# Patient Record
Sex: FEMALE | Race: WHITE | NOT HISPANIC OR LATINO | Employment: OTHER | URBAN - METROPOLITAN AREA
[De-identification: names, ages, dates, MRNs, and addresses within clinical notes are randomized per-mention and may not be internally consistent; named-entity substitution may affect disease eponyms.]

---

## 2019-04-17 ENCOUNTER — HOSPITAL ENCOUNTER (OUTPATIENT)
Dept: LAB | Facility: HOSPITAL | Age: 54
Discharge: HOME | End: 2019-04-17
Attending: FAMILY MEDICINE | Admitting: FAMILY MEDICINE

## 2019-04-17 LAB
ALBUMIN SERPL-MCNC: 3.8 G/DL (ref 3.5–4.8)
ALBUMIN/GLOB SERPL: 1.2 {RATIO} (ref 1–1.7)
ALP SERPL-CCNC: 85 IU/L (ref 32–91)
ALT SERPL-CCNC: 23 IU/L (ref 14–54)
ANION GAP SERPL CALC-SCNC: 14.6 MMOL/L (ref 10–20)
AST SERPL-CCNC: 24 IU/L (ref 15–41)
BASOPHILS # BLD AUTO: 0 10*3/UL (ref 0–0.2)
BASOPHILS NFR BLD AUTO: 0 % (ref 0–2)
BILIRUB SERPL-MCNC: 0.3 MG/DL (ref 0.3–1.2)
BUN SERPL-MCNC: 17 MG/DL (ref 8–20)
BUN/CREAT SERPL: 24.3 (ref 5.4–26.2)
CALCIUM SERPL-MCNC: 9.1 MG/DL (ref 8.9–10.3)
CHLORIDE SERPL-SCNC: 103 MMOL/L (ref 101–111)
CHOLEST SERPL-MCNC: 155 MG/DL
CHOLEST/HDLC SERPL: 3.1 {RATIO}
CONV CO2: 24 MMOL/L (ref 22–32)
CONV LDL CHOLESTEROL DIRECT: 86 MG/DL (ref 0–100)
CONV TOTAL PROTEIN: 7 G/DL (ref 6.1–7.9)
CREAT UR-MCNC: 0.7 MG/DL (ref 0.4–1)
DIFFERENTIAL METHOD BLD: (no result)
EOSINOPHIL # BLD AUTO: 0.1 10*3/UL (ref 0–0.3)
EOSINOPHIL # BLD AUTO: 2 % (ref 0–3)
ERYTHROCYTE [DISTWIDTH] IN BLOOD BY AUTOMATED COUNT: 13.5 % (ref 11.5–14.5)
GLOBULIN UR ELPH-MCNC: 3.2 G/DL (ref 2.5–3.8)
GLUCOSE SERPL-MCNC: 98 MG/DL (ref 65–99)
HCT VFR BLD AUTO: 37.6 % (ref 35–49)
HDLC SERPL-MCNC: 51 MG/DL
HGB BLD-MCNC: 12.6 G/DL (ref 12–15)
LDLC/HDLC SERPL: 1.7 {RATIO}
LIPID INTERPRETATION: NORMAL
LYMPHOCYTES # BLD AUTO: 1.4 10*3/UL (ref 0.8–4.8)
LYMPHOCYTES NFR BLD AUTO: 28 % (ref 18–42)
MCH RBC QN AUTO: 30.9 PG (ref 26–32)
MCHC RBC AUTO-ENTMCNC: 33.4 G/DL (ref 32–36)
MCV RBC AUTO: 92.3 FL (ref 80–94)
MONOCYTES # BLD AUTO: 0.4 10*3/UL (ref 0.1–1.3)
MONOCYTES NFR BLD AUTO: 7 % (ref 2–11)
NEUTROPHILS # BLD AUTO: 3.2 10*3/UL (ref 2.3–8.6)
NEUTROPHILS NFR BLD AUTO: 63 % (ref 50–75)
NRBC BLD AUTO-RTO: 0 /100{WBCS}
NRBC/RBC NFR BLD MANUAL: 0 10*3/UL
PLATELET # BLD AUTO: 157 10*3/UL (ref 150–450)
PMV BLD AUTO: 10.1 FL (ref 7.4–10.4)
POTASSIUM SERPL-SCNC: 3.6 MMOL/L (ref 3.6–5.1)
RBC # BLD AUTO: 4.07 10*6/UL (ref 4–5.4)
SODIUM SERPL-SCNC: 138 MMOL/L (ref 136–144)
TRIGL SERPL-MCNC: 54 MG/DL
VLDLC SERPL CALC-MCNC: 17.9 MG/DL
WBC # BLD AUTO: 5.1 10*3/UL (ref 4.5–11.5)

## 2019-08-20 ENCOUNTER — LAB (OUTPATIENT)
Dept: LAB | Facility: HOSPITAL | Age: 54
End: 2019-08-20

## 2019-08-20 ENCOUNTER — TRANSCRIBE ORDERS (OUTPATIENT)
Dept: ADMINISTRATIVE | Facility: HOSPITAL | Age: 54
End: 2019-08-20

## 2019-08-20 DIAGNOSIS — R32 URINARY INCONTINENCE, UNSPECIFIED TYPE: ICD-10-CM

## 2019-08-20 DIAGNOSIS — R32 URINARY INCONTINENCE, UNSPECIFIED TYPE: Primary | ICD-10-CM

## 2019-08-20 LAB
BACTERIA UR QL AUTO: ABNORMAL /HPF
BILIRUB UR QL STRIP: NEGATIVE
CLARITY UR: ABNORMAL
COLOR UR: YELLOW
GLUCOSE UR STRIP-MCNC: NEGATIVE MG/DL
HGB UR QL STRIP.AUTO: ABNORMAL
HYALINE CASTS UR QL AUTO: ABNORMAL /LPF
KETONES UR QL STRIP: NEGATIVE
LEUKOCYTE ESTERASE UR QL STRIP.AUTO: ABNORMAL
NITRITE UR QL STRIP: NEGATIVE
PH UR STRIP.AUTO: 6 [PH] (ref 5–8)
PROT UR QL STRIP: NEGATIVE
RBC # UR: ABNORMAL /HPF
REF LAB TEST METHOD: ABNORMAL
SP GR UR STRIP: <=1.005 (ref 1–1.03)
SQUAMOUS #/AREA URNS HPF: ABNORMAL /HPF
UROBILINOGEN UR QL STRIP: ABNORMAL
WBC UR QL AUTO: ABNORMAL /HPF

## 2019-08-20 PROCEDURE — 81001 URINALYSIS AUTO W/SCOPE: CPT

## 2019-09-10 ENCOUNTER — TRANSCRIBE ORDERS (OUTPATIENT)
Dept: ADMINISTRATIVE | Facility: HOSPITAL | Age: 54
End: 2019-09-10

## 2019-09-10 DIAGNOSIS — R33.9 URINARY RETENTION: Primary | ICD-10-CM

## 2019-09-12 ENCOUNTER — HOSPITAL ENCOUNTER (OUTPATIENT)
Dept: ULTRASOUND IMAGING | Facility: HOSPITAL | Age: 54
Discharge: HOME | End: 2019-09-12
Admitting: UROLOGY

## 2019-09-12 DIAGNOSIS — R33.9 URINARY RETENTION: ICD-10-CM

## 2019-09-12 PROCEDURE — 76775 US EXAM ABDO BACK WALL LIM: CPT

## 2019-11-26 ENCOUNTER — OFFICE (OUTPATIENT)
Dept: URBAN - METROPOLITAN AREA CLINIC 64 | Facility: CLINIC | Age: 54
End: 2019-11-26
Payer: MEDICARE

## 2019-11-26 VITALS
DIASTOLIC BLOOD PRESSURE: 73 MMHG | SYSTOLIC BLOOD PRESSURE: 109 MMHG | WEIGHT: 158 LBS | HEIGHT: 67 IN | HEART RATE: 84 BPM

## 2019-11-26 DIAGNOSIS — Z12.11 ENCOUNTER FOR SCREENING FOR MALIGNANT NEOPLASM OF COLON: ICD-10-CM

## 2019-11-26 DIAGNOSIS — R05 COUGH: ICD-10-CM

## 2019-11-26 DIAGNOSIS — K21.9 GASTRO-ESOPHAGEAL REFLUX DISEASE WITHOUT ESOPHAGITIS: ICD-10-CM

## 2019-11-26 PROCEDURE — 99243 OFF/OP CNSLTJ NEW/EST LOW 30: CPT | Performed by: NURSE PRACTITIONER

## 2019-11-26 PROCEDURE — 99203 OFFICE O/P NEW LOW 30 MIN: CPT | Performed by: NURSE PRACTITIONER

## 2019-11-29 RX ORDER — LEVOTHYROXINE SODIUM 0.1 MG/1
100 TABLET ORAL DAILY
COMMUNITY
End: 2022-08-02

## 2019-11-29 RX ORDER — VENLAFAXINE HYDROCHLORIDE 75 MG/1
75 CAPSULE, EXTENDED RELEASE ORAL DAILY
COMMUNITY
End: 2022-01-05

## 2019-11-29 RX ORDER — LORAZEPAM 0.5 MG/1
0.5 TABLET ORAL 2 TIMES DAILY
COMMUNITY
End: 2021-04-20 | Stop reason: SDUPTHER

## 2019-11-29 RX ORDER — LORATADINE 10 MG/1
1 CAPSULE, LIQUID FILLED ORAL DAILY
COMMUNITY
End: 2022-08-02 | Stop reason: SDUPTHER

## 2019-11-29 RX ORDER — QUETIAPINE FUMARATE 50 MG/1
50 TABLET, FILM COATED ORAL 2 TIMES DAILY
COMMUNITY
End: 2021-04-20 | Stop reason: SDUPTHER

## 2019-11-29 RX ORDER — OMEPRAZOLE 40 MG/1
40 CAPSULE, DELAYED RELEASE ORAL DAILY
COMMUNITY
End: 2022-08-02 | Stop reason: SDUPTHER

## 2019-12-02 ENCOUNTER — ANESTHESIA EVENT (OUTPATIENT)
Dept: GASTROENTEROLOGY | Facility: HOSPITAL | Age: 54
End: 2019-12-02

## 2019-12-02 RX ORDER — MAGNESIUM CARB/ALUMINUM HYDROX 105-160MG
296 TABLET,CHEWABLE ORAL ONCE
Status: DISCONTINUED | OUTPATIENT
Start: 2019-12-02 | End: 2019-12-03 | Stop reason: HOSPADM

## 2019-12-03 ENCOUNTER — ANESTHESIA (OUTPATIENT)
Dept: GASTROENTEROLOGY | Facility: HOSPITAL | Age: 54
End: 2019-12-03

## 2019-12-03 ENCOUNTER — HOSPITAL ENCOUNTER (OUTPATIENT)
Facility: HOSPITAL | Age: 54
Setting detail: HOSPITAL OUTPATIENT SURGERY
Discharge: HOME | End: 2019-12-03
Attending: INTERNAL MEDICINE | Admitting: INTERNAL MEDICINE

## 2019-12-03 ENCOUNTER — ON CAMPUS - OUTPATIENT (OUTPATIENT)
Dept: URBAN - METROPOLITAN AREA HOSPITAL 85 | Facility: HOSPITAL | Age: 54
End: 2019-12-03

## 2019-12-03 VITALS
RESPIRATION RATE: 14 BRPM | SYSTOLIC BLOOD PRESSURE: 116 MMHG | WEIGHT: 158 LBS | BODY MASS INDEX: 24.8 KG/M2 | OXYGEN SATURATION: 99 % | TEMPERATURE: 97.9 F | HEART RATE: 82 BPM | HEIGHT: 67 IN | DIASTOLIC BLOOD PRESSURE: 72 MMHG

## 2019-12-03 DIAGNOSIS — Z12.11 ENCOUNTER FOR SCREENING FOR MALIGNANT NEOPLASM OF COLON: ICD-10-CM

## 2019-12-03 DIAGNOSIS — Z12.11 SCREENING FOR MALIGNANT NEOPLASM OF COLON: ICD-10-CM

## 2019-12-03 DIAGNOSIS — R05.3 CHRONIC COUGH: ICD-10-CM

## 2019-12-03 DIAGNOSIS — K44.9 DIAPHRAGMATIC HERNIA WITHOUT OBSTRUCTION OR GANGRENE: ICD-10-CM

## 2019-12-03 DIAGNOSIS — K21.9 GASTRO-ESOPHAGEAL REFLUX DISEASE WITHOUT ESOPHAGITIS: ICD-10-CM

## 2019-12-03 DIAGNOSIS — K63.5 POLYP OF COLON: ICD-10-CM

## 2019-12-03 DIAGNOSIS — R05 COUGH: ICD-10-CM

## 2019-12-03 DIAGNOSIS — K22.2 ESOPHAGEAL OBSTRUCTION: ICD-10-CM

## 2019-12-03 DIAGNOSIS — D12.8 BENIGN NEOPLASM OF RECTUM: ICD-10-CM

## 2019-12-03 DIAGNOSIS — K21.9 GERD (GASTROESOPHAGEAL REFLUX DISEASE): ICD-10-CM

## 2019-12-03 PROCEDURE — 43235 EGD DIAGNOSTIC BRUSH WASH: CPT | Mod: 59 | Performed by: INTERNAL MEDICINE

## 2019-12-03 PROCEDURE — 45388 COLONOSCOPY W/ABLATION: CPT | Mod: PT,59 | Performed by: INTERNAL MEDICINE

## 2019-12-03 PROCEDURE — 45388 COLONOSCOPY W/ABLATION: CPT | Mod: 59,PT | Performed by: INTERNAL MEDICINE

## 2019-12-03 PROCEDURE — 25010000002 PROPOFOL 200 MG/20ML EMULSION: Performed by: ANESTHESIOLOGY

## 2019-12-03 PROCEDURE — 45390 COLONOSCOPY W/RESECTION: CPT | Mod: 59,PT | Performed by: INTERNAL MEDICINE

## 2019-12-03 PROCEDURE — 43450 DILATE ESOPHAGUS 1/MULT PASS: CPT | Performed by: INTERNAL MEDICINE

## 2019-12-03 PROCEDURE — 88305 TISSUE EXAM BY PATHOLOGIST: CPT | Performed by: INTERNAL MEDICINE

## 2019-12-03 PROCEDURE — 45390 COLONOSCOPY W/RESECTION: CPT | Mod: PT,59 | Performed by: INTERNAL MEDICINE

## 2019-12-03 PROCEDURE — 45385 COLONOSCOPY W/LESION REMOVAL: CPT | Mod: 59,PT | Performed by: INTERNAL MEDICINE

## 2019-12-03 RX ORDER — SODIUM CHLORIDE 0.9 % (FLUSH) 0.9 %
3 SYRINGE (ML) INJECTION EVERY 12 HOURS SCHEDULED
Status: DISCONTINUED | OUTPATIENT
Start: 2019-12-03 | End: 2019-12-03 | Stop reason: HOSPADM

## 2019-12-03 RX ORDER — SODIUM CHLORIDE 0.9 % (FLUSH) 0.9 %
3-10 SYRINGE (ML) INJECTION AS NEEDED
Status: DISCONTINUED | OUTPATIENT
Start: 2019-12-03 | End: 2019-12-03 | Stop reason: HOSPADM

## 2019-12-03 RX ORDER — SODIUM CHLORIDE 9 MG/ML
9 INJECTION, SOLUTION INTRAVENOUS CONTINUOUS PRN
Status: DISCONTINUED | OUTPATIENT
Start: 2019-12-03 | End: 2019-12-03 | Stop reason: HOSPADM

## 2019-12-03 RX ORDER — PROPOFOL 10 MG/ML
INJECTION, EMULSION INTRAVENOUS AS NEEDED
Status: DISCONTINUED | OUTPATIENT
Start: 2019-12-03 | End: 2019-12-03 | Stop reason: SURG

## 2019-12-03 RX ORDER — SODIUM CHLORIDE 0.9 % (FLUSH) 0.9 %
10 SYRINGE (ML) INJECTION AS NEEDED
Status: DISCONTINUED | OUTPATIENT
Start: 2019-12-03 | End: 2019-12-03 | Stop reason: HOSPADM

## 2019-12-03 RX ORDER — LIDOCAINE HYDROCHLORIDE 20 MG/ML
INJECTION, SOLUTION EPIDURAL; INFILTRATION; INTRACAUDAL; PERINEURAL AS NEEDED
Status: DISCONTINUED | OUTPATIENT
Start: 2019-12-03 | End: 2019-12-03 | Stop reason: SURG

## 2019-12-03 RX ORDER — ONDANSETRON 2 MG/ML
4 INJECTION INTRAMUSCULAR; INTRAVENOUS ONCE AS NEEDED
Status: DISCONTINUED | OUTPATIENT
Start: 2019-12-03 | End: 2019-12-03 | Stop reason: HOSPADM

## 2019-12-03 RX ADMIN — PROPOFOL 25 MG: 10 INJECTION, EMULSION INTRAVENOUS at 11:30

## 2019-12-03 RX ADMIN — PROPOFOL 100 MG: 10 INJECTION, EMULSION INTRAVENOUS at 11:05

## 2019-12-03 RX ADMIN — SODIUM CHLORIDE 9 ML/HR: 900 INJECTION, SOLUTION INTRAVENOUS at 10:11

## 2019-12-03 RX ADMIN — PROPOFOL 50 MG: 10 INJECTION, EMULSION INTRAVENOUS at 10:45

## 2019-12-03 RX ADMIN — LIDOCAINE HYDROCHLORIDE 50 MG: 20 INJECTION, SOLUTION EPIDURAL; INFILTRATION; INTRACAUDAL; PERINEURAL at 11:05

## 2019-12-03 RX ADMIN — PROPOFOL 25 MG: 10 INJECTION, EMULSION INTRAVENOUS at 11:22

## 2019-12-03 RX ADMIN — PROPOFOL 25 MG: 10 INJECTION, EMULSION INTRAVENOUS at 11:39

## 2019-12-03 RX ADMIN — PROPOFOL 50 MG: 10 INJECTION, EMULSION INTRAVENOUS at 11:10

## 2019-12-03 RX ADMIN — PROPOFOL 50 MG: 10 INJECTION, EMULSION INTRAVENOUS at 11:07

## 2019-12-03 RX ADMIN — PROPOFOL 50 MG: 10 INJECTION, EMULSION INTRAVENOUS at 11:15

## 2019-12-03 NOTE — OP NOTE
ESOPHAGOGASTRODUODENOSCOPY, COLONOSCOPY Procedure Report    Patient Name:  Erna Shine  YOB: 1965    Date of Surgery:  12/3/2019     Pre-Op Diagnosis:  GERD, CHRONIC COUGH, SCREENING FOR MALIGNANT NEOPLASMS OF COLON         Procedure/CPT® Codes:      Procedure(s):  Esophagogastroduodenoscopy with dilatation (bougie 50 cm) and biopsy x 1 area  colonoscopy with sclerotherapy of polypectomy site x1 area, polypectomy x4, fulgrated polyp x4     Staff:  Surgeon(s):  Raymond Gan MD      Anesthesia: Monitored Anesthesia Care    Description of Procedure:  A description of the procedure as well as risks, benefits and alternative methods were explained to the patient who voiced understanding and signed the corresponding consent form. A physical exam was performed and vital signs were monitored throughout the procedure.    An upper GI endoscope was placed into the mouth and proceeded through the esophagus, stomach and second portion of the duodenum without difficulty. The scope was then retroflexed and the fundus was visualized. The procedure was not difficult and there were no immediate complications.  C of 190 scope ventricularization rectum to cecum and terminal ileum.  Prep was marginal.    Upper endoscopy examination did show inflammatory changes as well as some mucosal irregularity in the lower third of the esophagus biopsy was taken.  There is also a stricture of the lower third of the esophagus which was dilated to 50 [place].  At least 7 cm hiatal hernia was noted with a hiatus narrowing around 42 cm and GE junction 35 cm.    Gastric dual mucosa looks normal.    In cecum, large around 1.5 cm sessile multilobulated polyp was lifted with the oral eyes and subsequently Endo mucosal resection was performed.  1 of the polyp of the cecum was also removed with a hot snare polypectomy technique which was around 8 to 7 mm.  Tumor polyp removed from the ascending colon area with a hot snare  polypectomy technique measuring at 6 and 7 mm.  3 polyp removed from the rectum they were fulgurated.  Another polyp in the sigmoid was fulgurated.  Extensive there is some diverticulosis sigmoid descending colon noted with a moderate-sized internal/external hemorrhoid.    Impression:  1.  Large hiatal hernia  2.  Esophageal stricture dilated  3.  Suspect erosive esophagitis due to acid reflux however due to significant nodularity of the lower third, biopsy was performed.  4.  Multiple polyps removed as detailed above.  5.  diverticulosis  6.  hemorrhoids    Recommendations:  Repeat colonoscopy in 3 years.  Patient will be placed on a PPI p.o. daily.  Follow the GI clinic as needed.  MiraLAX once a day.  Follow up with GI clinic as needed  Follow up with PCP as scheduled  Follow up with biopsy report      Raymond Gan MD     Date: 12/3/2019    Time: 11:42 AM

## 2019-12-03 NOTE — ANESTHESIA POSTPROCEDURE EVALUATION
Patient: Erna Shine    Procedure Summary     Date:  12/03/19 Room / Location:  [facility] ENDOSCOPY 1 / [facility] ENDOSCOPY    Anesthesia Start:  1101 Anesthesia Stop:      Procedures:       Esophagogastroduodenoscopy with dilatation (bougie 50 cm) and biopsy x 1 area (N/A )      colonoscopy with sclerotherapy of polypectomy site x1 area, polypectomy x4, fulgrated polyp x4  (N/A Rectum) Diagnosis:       GERD (gastroesophageal reflux disease)      Chronic cough      Screening for malignant neoplasm of colon      (GERD, CHRONIC COUGH, SCREENING FOR MALIGNANT NEOPLASMS OF COLON)    Surgeon:  Raymond Gan MD Provider:  Steven Johnson MD    Anesthesia Type:  MAC ASA Status:  2          Anesthesia Type: MAC  Last vitals  BP   107/65 (12/03/19 1004)   Temp   97.9 °F (36.6 °C) (12/03/19 1004)   Pulse   84 (12/03/19 1004)   Resp   20 (12/03/19 1004)     SpO2   99 % (12/03/19 1004)     Post Anesthesia Care and Evaluation    Patient location during evaluation: ICU  Patient participation: complete - patient cannot participate  Level of consciousness: obtunded/minimal responses  Pain scale: See nurse's notes for pain score.  Pain management: adequate  Airway patency: patent  Anesthetic complications: No anesthetic complications  PONV Status: none  Cardiovascular status: acceptable  Respiratory status: acceptable, oral airway and nasal cannula  Hydration status: acceptable    Comments: Patient seen and examined postoperatively; vital signs stable; SpO2 greater than or equal to 90%; cardiopulmonary status stable; nausea/vomiting adequately controlled; pain adequately controlled; no apparent anesthesia complications; patient discharged from anesthesia care when discharge criteria were met

## 2019-12-03 NOTE — DISCHARGE INSTRUCTIONS
A responsible adult should stay with you and you should rest quietly for the rest of the day.    Do not drink alcohol, drive, operate any heavy machinery or power tools or make any legal/important decisions for the next 24 hours.     Progress your diet as tolerated.  If you begin to experience severe pain, increased shortness of breath, racing heartbeat or a fever above 101 F, seek immediate medical attention.     Follow up with MD as instructed. Call office for results in 3 to 5 days [phone].  Take Miralax Daily.  Take PPI (reflux medication) daily.

## 2019-12-03 NOTE — ANESTHESIA PREPROCEDURE EVALUATION
Anesthesia Evaluation     Patient summary reviewed and Nursing notes reviewed   NPO Solid Status: > 6 hours  NPO Liquid Status: > 6 hours           Airway   Mallampati: I  TM distance: >3 FB  Neck ROM: full  No difficulty expected  Dental - normal exam   (+) poor dentition    Pulmonary - negative pulmonary ROS and normal exam   Cardiovascular - negative cardio ROS and normal exam    ECG reviewed        Neuro/Psych- negative ROS  GI/Hepatic/Renal/Endo    (+)  GERD,      Musculoskeletal (-) negative ROS    Abdominal  - normal exam    Bowel sounds: normal.   Substance History - negative use     OB/GYN          Other                        Anesthesia Plan    ASA 2     MAC     intravenous induction     Anesthetic plan, all risks, benefits, and alternatives have been provided, discussed and informed consent has been obtained with: legal guardian.

## 2019-12-03 NOTE — H&P
Patient Care Team:  Esther Morris MD as PCP - General  Esther Morris MD as PCP - Family Medicine      Subjective .     History of present illness:    Erna Shine is a 54 y.o. female who presents today for Procedure(s):  Esophagogastroduodenoscopy  COLONOSCOPY for the indications listed below.     The updated Patient Profile was reviewed prior to the procedure, in conjunction with the Physical Exam, including medical conditions, surgical procedures, medications, allergies, family history and social history.     Pre-operatively, I reviewed the indication(s) for the procedure, the risks of the procedure [including but not limited to: unexpected bleeding possibly requiring hospitalization and/or unplanned repeat procedures, perforation possibly requiring surgical treatment, missed lesions and complications of sedation/MAC (also explained by anesthesia staff)].     I have evaluated the patient for risks associated with the planned anesthesia and the procedure to be performed and find the patient an acceptable candidate for IV sedation.    Multiple opportunities were provided for any questions or concerns, and all questions were answered satisfactorily before any anesthesia was administered. We will proceed with the planned procedure.      ASSESSMENT/PLAN:  EGD  COLON         Past Medical History:  Past Medical History:   Diagnosis Date   • Anxiety    • Dementia (CMS/HCC)    • Disease of thyroid gland    • GERD (gastroesophageal reflux disease)        Past Surgical History:  Past Surgical History:   Procedure Laterality Date   • THYROID SURGERY         Social History:  Social History     Tobacco Use   • Smoking status: Never Smoker   • Smokeless tobacco: Never Used   Substance Use Topics   • Alcohol use: No     Frequency: Never   • Drug use: No       Family History:  History reviewed. No pertinent family history.    Medications:  No medications prior to admission.       Scheduled Meds:  magnesium citrate 296 mL  Oral Once     Continuous Infusions:   PRN Meds:.    ALLERGIES:  Orange juice        Objective     Vital Signs:        Physical Exam:      General Appearance:    Awake and alert, in no acute distress   Lungs:     Clear to auscultation bilaterally, respirations regular, even and unlabored    Heart:    Regular rhythm and normal rate, normal S1 and S2, no            murmur, no gallop, no rub   Abdomen:     Normal bowel sounds, soft, non-tender, no rebound or guarding, non-distended, no hepatosplenomegaly        Results Review:   I reviewed the patient's labs and imaging.  Lab Results (last 24 hours)     ** No results found for the last 24 hours. **          Imaging Results (Last 24 Hours)     ** No results found for the last 24 hours. **             I discussed the patients findings and my recommendations with the patient.  Raymond Gan MD  12/02/19  9:02 PM

## 2019-12-04 LAB
LAB AP CASE REPORT: NORMAL
PATH REPORT.FINAL DX SPEC: NORMAL
PATH REPORT.GROSS SPEC: NORMAL

## 2019-12-09 ENCOUNTER — TRANSCRIBE ORDERS (OUTPATIENT)
Dept: ADMINISTRATIVE | Facility: HOSPITAL | Age: 54
End: 2019-12-09

## 2019-12-09 ENCOUNTER — LAB (OUTPATIENT)
Dept: LAB | Facility: HOSPITAL | Age: 54
End: 2019-12-09

## 2019-12-09 DIAGNOSIS — Z01.818 PREOP EXAMINATION: Primary | ICD-10-CM

## 2019-12-09 DIAGNOSIS — Z01.818 PREOP EXAMINATION: ICD-10-CM

## 2019-12-09 LAB
ABO GROUP BLD: NORMAL
BLD GP AB SCN SERPL QL: NEGATIVE
RH BLD: POSITIVE
T&S EXPIRATION DATE: NORMAL

## 2019-12-09 PROCEDURE — 86900 BLOOD TYPING SEROLOGIC ABO: CPT

## 2019-12-09 PROCEDURE — 86900 BLOOD TYPING SEROLOGIC ABO: CPT | Performed by: OBSTETRICS & GYNECOLOGY

## 2019-12-09 PROCEDURE — 81001 URINALYSIS AUTO W/SCOPE: CPT

## 2019-12-09 PROCEDURE — 85025 COMPLETE CBC W/AUTO DIFF WBC: CPT

## 2019-12-09 PROCEDURE — 80048 BASIC METABOLIC PNL TOTAL CA: CPT

## 2019-12-09 PROCEDURE — 86901 BLOOD TYPING SEROLOGIC RH(D): CPT

## 2019-12-09 PROCEDURE — 36415 COLL VENOUS BLD VENIPUNCTURE: CPT

## 2019-12-09 PROCEDURE — 86850 RBC ANTIBODY SCREEN: CPT | Performed by: OBSTETRICS & GYNECOLOGY

## 2019-12-09 PROCEDURE — 86901 BLOOD TYPING SEROLOGIC RH(D): CPT | Performed by: OBSTETRICS & GYNECOLOGY

## 2019-12-10 LAB
ANION GAP SERPL CALCULATED.3IONS-SCNC: 12 MMOL/L (ref 5–15)
BACTERIA UR QL AUTO: ABNORMAL /HPF
BASOPHILS # BLD AUTO: 0.01 10*3/MM3 (ref 0–0.2)
BASOPHILS NFR BLD AUTO: 0.2 % (ref 0–1.5)
BILIRUB UR QL STRIP: NEGATIVE
BUN BLD-MCNC: 16 MG/DL (ref 6–20)
BUN/CREAT SERPL: 19.5 (ref 7–25)
CALCIUM SPEC-SCNC: 8.8 MG/DL (ref 8.6–10.5)
CHLORIDE SERPL-SCNC: 102 MMOL/L (ref 98–107)
CLARITY UR: ABNORMAL
CO2 SERPL-SCNC: 25 MMOL/L (ref 22–29)
COLOR UR: YELLOW
CREAT BLD-MCNC: 0.82 MG/DL (ref 0.57–1)
DEPRECATED RDW RBC AUTO: 42.3 FL (ref 37–54)
EOSINOPHIL # BLD AUTO: 0.14 10*3/MM3 (ref 0–0.4)
EOSINOPHIL NFR BLD AUTO: 2.6 % (ref 0.3–6.2)
ERYTHROCYTE [DISTWIDTH] IN BLOOD BY AUTOMATED COUNT: 12.7 % (ref 12.3–15.4)
GFR SERPL CREATININE-BSD FRML MDRD: 73 ML/MIN/1.73
GLUCOSE BLD-MCNC: 92 MG/DL (ref 65–99)
GLUCOSE UR STRIP-MCNC: NEGATIVE MG/DL
HCT VFR BLD AUTO: 38.4 % (ref 34–46.6)
HGB BLD-MCNC: 12.3 G/DL (ref 12–15.9)
HGB UR QL STRIP.AUTO: ABNORMAL
HYALINE CASTS UR QL AUTO: ABNORMAL /LPF
IMM GRANULOCYTES # BLD AUTO: 0.01 10*3/MM3 (ref 0–0.05)
IMM GRANULOCYTES NFR BLD AUTO: 0.2 % (ref 0–0.5)
KETONES UR QL STRIP: NEGATIVE
LEUKOCYTE ESTERASE UR QL STRIP.AUTO: ABNORMAL
LYMPHOCYTES # BLD AUTO: 1.43 10*3/MM3 (ref 0.7–3.1)
LYMPHOCYTES NFR BLD AUTO: 26.9 % (ref 19.6–45.3)
MCH RBC QN AUTO: 29.4 PG (ref 26.6–33)
MCHC RBC AUTO-ENTMCNC: 32 G/DL (ref 31.5–35.7)
MCV RBC AUTO: 91.6 FL (ref 79–97)
MONOCYTES # BLD AUTO: 0.32 10*3/MM3 (ref 0.1–0.9)
MONOCYTES NFR BLD AUTO: 6 % (ref 5–12)
NEUTROPHILS # BLD AUTO: 3.41 10*3/MM3 (ref 1.7–7)
NEUTROPHILS NFR BLD AUTO: 64.1 % (ref 42.7–76)
NITRITE UR QL STRIP: NEGATIVE
NRBC BLD AUTO-RTO: 0 /100 WBC (ref 0–0.2)
PH UR STRIP.AUTO: 6.5 [PH] (ref 5–8)
PLATELET # BLD AUTO: 143 10*3/MM3 (ref 140–450)
PMV BLD AUTO: 12.7 FL (ref 6–12)
POTASSIUM BLD-SCNC: 3.9 MMOL/L (ref 3.5–5.2)
PROT UR QL STRIP: NEGATIVE
RBC # BLD AUTO: 4.19 10*6/MM3 (ref 3.77–5.28)
RBC # UR: ABNORMAL /HPF
REF LAB TEST METHOD: ABNORMAL
SODIUM BLD-SCNC: 139 MMOL/L (ref 136–145)
SP GR UR STRIP: 1.01 (ref 1–1.03)
SQUAMOUS #/AREA URNS HPF: ABNORMAL /HPF
UROBILINOGEN UR QL STRIP: ABNORMAL
WBC NRBC COR # BLD: 5.32 10*3/MM3 (ref 3.4–10.8)
WBC UR QL AUTO: ABNORMAL /HPF

## 2019-12-12 ENCOUNTER — LAB (OUTPATIENT)
Dept: LAB | Facility: HOSPITAL | Age: 54
End: 2019-12-12

## 2019-12-12 ENCOUNTER — TRANSCRIBE ORDERS (OUTPATIENT)
Dept: ADMINISTRATIVE | Facility: HOSPITAL | Age: 54
End: 2019-12-12

## 2019-12-12 DIAGNOSIS — Z01.818 OTHER SPECIFIED PRE-OPERATIVE EXAMINATION: ICD-10-CM

## 2019-12-12 DIAGNOSIS — Z01.818 OTHER SPECIFIED PRE-OPERATIVE EXAMINATION: Primary | ICD-10-CM

## 2019-12-12 LAB — FSH SERPL-ACNC: 9.79 MIU/ML

## 2019-12-12 PROCEDURE — 36415 COLL VENOUS BLD VENIPUNCTURE: CPT

## 2019-12-12 PROCEDURE — 83001 ASSAY OF GONADOTROPIN (FSH): CPT

## 2019-12-13 ENCOUNTER — LAB REQUISITION (OUTPATIENT)
Dept: LAB | Facility: HOSPITAL | Age: 54
End: 2019-12-13

## 2019-12-13 DIAGNOSIS — R93.89 ABNORMAL FINDINGS ON DIAGNOSTIC IMAGING OF OTHER SPECIFIED BODY STRUCTURES: ICD-10-CM

## 2019-12-13 DIAGNOSIS — N83.202 UNSPECIFIED OVARIAN CYST, LEFT SIDE: ICD-10-CM

## 2019-12-13 PROCEDURE — 88307 TISSUE EXAM BY PATHOLOGIST: CPT | Performed by: OBSTETRICS & GYNECOLOGY

## 2019-12-16 LAB
LAB AP CASE REPORT: NORMAL
PATH REPORT.FINAL DX SPEC: NORMAL
PATH REPORT.GROSS SPEC: NORMAL

## 2020-05-20 ENCOUNTER — TRANSCRIBE ORDERS (OUTPATIENT)
Dept: ADMINISTRATIVE | Facility: HOSPITAL | Age: 55
End: 2020-05-20

## 2020-05-20 DIAGNOSIS — R93.89 THICKENED ENDOMETRIUM: Primary | ICD-10-CM

## 2020-05-29 ENCOUNTER — APPOINTMENT (OUTPATIENT)
Dept: ULTRASOUND IMAGING | Facility: HOSPITAL | Age: 55
End: 2020-05-29

## 2020-11-12 ENCOUNTER — APPOINTMENT (OUTPATIENT)
Dept: CT IMAGING | Facility: HOSPITAL | Age: 55
End: 2020-11-12

## 2020-11-12 ENCOUNTER — HOSPITAL ENCOUNTER (INPATIENT)
Facility: HOSPITAL | Age: 55
LOS: 2 days | Discharge: HOME | End: 2020-11-14
Attending: FAMILY MEDICINE | Admitting: FAMILY MEDICINE

## 2020-11-12 DIAGNOSIS — R11.2 INTRACTABLE VOMITING WITH NAUSEA, UNSPECIFIED VOMITING TYPE: ICD-10-CM

## 2020-11-12 DIAGNOSIS — K56.609 SBO (SMALL BOWEL OBSTRUCTION) (HCC): Primary | ICD-10-CM

## 2020-11-12 DIAGNOSIS — N30.01 ACUTE CYSTITIS WITH HEMATURIA: ICD-10-CM

## 2020-11-12 LAB
ALBUMIN SERPL-MCNC: 4.1 G/DL (ref 3.5–5.2)
ALBUMIN/GLOB SERPL: 1.3 G/DL
ALP SERPL-CCNC: 87 U/L (ref 39–117)
ALT SERPL W P-5'-P-CCNC: 20 U/L (ref 1–33)
ANION GAP SERPL CALCULATED.3IONS-SCNC: 13 MMOL/L (ref 5–15)
AST SERPL-CCNC: 17 U/L (ref 1–32)
BACTERIA UR QL AUTO: ABNORMAL /HPF
BASOPHILS # BLD AUTO: 0 10*3/MM3 (ref 0–0.2)
BASOPHILS NFR BLD AUTO: 0.2 % (ref 0–1.5)
BILIRUB SERPL-MCNC: 0.6 MG/DL (ref 0–1.2)
BILIRUB UR QL STRIP: ABNORMAL
BUN SERPL-MCNC: 33 MG/DL (ref 6–20)
BUN/CREAT SERPL: 37.5 (ref 7–25)
CALCIUM SPEC-SCNC: 9 MG/DL (ref 8.6–10.5)
CHLORIDE SERPL-SCNC: 101 MMOL/L (ref 98–107)
CLARITY UR: CLEAR
CO2 SERPL-SCNC: 26 MMOL/L (ref 22–29)
COLOR UR: ABNORMAL
CREAT SERPL-MCNC: 0.88 MG/DL (ref 0.57–1)
DEPRECATED RDW RBC AUTO: 42.4 FL (ref 37–54)
EOSINOPHIL # BLD AUTO: 0 10*3/MM3 (ref 0–0.4)
EOSINOPHIL NFR BLD AUTO: 0.5 % (ref 0.3–6.2)
ERYTHROCYTE [DISTWIDTH] IN BLOOD BY AUTOMATED COUNT: 13.5 % (ref 12.3–15.4)
GFR SERPL CREATININE-BSD FRML MDRD: 67 ML/MIN/1.73
GLOBULIN UR ELPH-MCNC: 3.1 GM/DL
GLUCOSE SERPL-MCNC: 94 MG/DL (ref 65–99)
GLUCOSE UR STRIP-MCNC: NEGATIVE MG/DL
HCT VFR BLD AUTO: 36.6 % (ref 34–46.6)
HGB BLD-MCNC: 12 G/DL (ref 12–15.9)
HGB UR QL STRIP.AUTO: ABNORMAL
HOLD SPECIMEN: NORMAL
HOLD SPECIMEN: NORMAL
HYALINE CASTS UR QL AUTO: ABNORMAL /LPF
KETONES UR QL STRIP: ABNORMAL
LEUKOCYTE ESTERASE UR QL STRIP.AUTO: ABNORMAL
LIPASE SERPL-CCNC: 22 U/L (ref 13–60)
LYMPHOCYTES # BLD AUTO: 0.7 10*3/MM3 (ref 0.7–3.1)
LYMPHOCYTES NFR BLD AUTO: 14.2 % (ref 19.6–45.3)
MCH RBC QN AUTO: 29.3 PG (ref 26.6–33)
MCHC RBC AUTO-ENTMCNC: 32.7 G/DL (ref 31.5–35.7)
MCV RBC AUTO: 89.6 FL (ref 79–97)
MONOCYTES # BLD AUTO: 0.6 10*3/MM3 (ref 0.1–0.9)
MONOCYTES NFR BLD AUTO: 10.5 % (ref 5–12)
NEUTROPHILS NFR BLD AUTO: 3.9 10*3/MM3 (ref 1.7–7)
NEUTROPHILS NFR BLD AUTO: 74.6 % (ref 42.7–76)
NITRITE UR QL STRIP: NEGATIVE
NRBC BLD AUTO-RTO: 0.1 /100 WBC (ref 0–0.2)
PH UR STRIP.AUTO: 5.5 [PH] (ref 5–8)
PLATELET # BLD AUTO: 176 10*3/MM3 (ref 140–450)
PMV BLD AUTO: 10.2 FL (ref 6–12)
POTASSIUM SERPL-SCNC: 3.6 MMOL/L (ref 3.5–5.2)
PROT SERPL-MCNC: 7.2 G/DL (ref 6–8.5)
PROT UR QL STRIP: ABNORMAL
RBC # BLD AUTO: 4.08 10*6/MM3 (ref 3.77–5.28)
RBC # UR: ABNORMAL /HPF
REF LAB TEST METHOD: ABNORMAL
SODIUM SERPL-SCNC: 140 MMOL/L (ref 136–145)
SP GR UR STRIP: >=1.03 (ref 1–1.03)
SQUAMOUS #/AREA URNS HPF: ABNORMAL /HPF
UROBILINOGEN UR QL STRIP: ABNORMAL
WBC # BLD AUTO: 5.3 10*3/MM3 (ref 3.4–10.8)
WBC UR QL AUTO: ABNORMAL /HPF
WHOLE BLOOD HOLD SPECIMEN: NORMAL

## 2020-11-12 PROCEDURE — P9612 CATHETERIZE FOR URINE SPEC: HCPCS

## 2020-11-12 PROCEDURE — 25010000002 CEFTRIAXONE PER 250 MG: Performed by: PHYSICIAN ASSISTANT

## 2020-11-12 PROCEDURE — 25010000002 LORAZEPAM PER 2 MG: Performed by: EMERGENCY MEDICINE

## 2020-11-12 PROCEDURE — 80053 COMPREHEN METABOLIC PANEL: CPT | Performed by: PHYSICIAN ASSISTANT

## 2020-11-12 PROCEDURE — 99222 1ST HOSP IP/OBS MODERATE 55: CPT | Performed by: SURGERY

## 2020-11-12 PROCEDURE — 87186 SC STD MICRODIL/AGAR DIL: CPT | Performed by: PHYSICIAN ASSISTANT

## 2020-11-12 PROCEDURE — 83690 ASSAY OF LIPASE: CPT | Performed by: PHYSICIAN ASSISTANT

## 2020-11-12 PROCEDURE — 85025 COMPLETE CBC W/AUTO DIFF WBC: CPT | Performed by: PHYSICIAN ASSISTANT

## 2020-11-12 PROCEDURE — 81001 URINALYSIS AUTO W/SCOPE: CPT | Performed by: PHYSICIAN ASSISTANT

## 2020-11-12 PROCEDURE — 74177 CT ABD & PELVIS W/CONTRAST: CPT

## 2020-11-12 PROCEDURE — 25010000002 ONDANSETRON PER 1 MG: Performed by: PHYSICIAN ASSISTANT

## 2020-11-12 PROCEDURE — 99284 EMERGENCY DEPT VISIT MOD MDM: CPT

## 2020-11-12 PROCEDURE — 87077 CULTURE AEROBIC IDENTIFY: CPT | Performed by: PHYSICIAN ASSISTANT

## 2020-11-12 PROCEDURE — 0 IOPAMIDOL PER 1 ML: Performed by: PHYSICIAN ASSISTANT

## 2020-11-12 PROCEDURE — 87086 URINE CULTURE/COLONY COUNT: CPT | Performed by: PHYSICIAN ASSISTANT

## 2020-11-12 RX ORDER — LORAZEPAM 2 MG/ML
1 INJECTION INTRAMUSCULAR ONCE
Status: COMPLETED | OUTPATIENT
Start: 2020-11-12 | End: 2020-11-12

## 2020-11-12 RX ORDER — AMOXICILLIN 250 MG
2 CAPSULE ORAL 2 TIMES DAILY PRN
Status: DISCONTINUED | OUTPATIENT
Start: 2020-11-12 | End: 2020-11-14 | Stop reason: HOSPADM

## 2020-11-12 RX ORDER — LEVOTHYROXINE SODIUM 0.1 MG/1
100 TABLET ORAL
Status: DISCONTINUED | OUTPATIENT
Start: 2020-11-13 | End: 2020-11-14 | Stop reason: HOSPADM

## 2020-11-12 RX ORDER — CETIRIZINE HYDROCHLORIDE 10 MG/1
10 TABLET ORAL DAILY
Status: DISCONTINUED | OUTPATIENT
Start: 2020-11-13 | End: 2020-11-14 | Stop reason: HOSPADM

## 2020-11-12 RX ORDER — CALCIUM CARBONATE 200(500)MG
2 TABLET,CHEWABLE ORAL 2 TIMES DAILY PRN
Status: DISCONTINUED | OUTPATIENT
Start: 2020-11-12 | End: 2020-11-14 | Stop reason: HOSPADM

## 2020-11-12 RX ORDER — VENLAFAXINE HYDROCHLORIDE 75 MG/1
75 CAPSULE, EXTENDED RELEASE ORAL DAILY
Status: DISCONTINUED | OUTPATIENT
Start: 2020-11-13 | End: 2020-11-14 | Stop reason: HOSPADM

## 2020-11-12 RX ORDER — ONDANSETRON 2 MG/ML
4 INJECTION INTRAMUSCULAR; INTRAVENOUS ONCE
Status: COMPLETED | OUTPATIENT
Start: 2020-11-12 | End: 2020-11-12

## 2020-11-12 RX ORDER — ONDANSETRON 2 MG/ML
4 INJECTION INTRAMUSCULAR; INTRAVENOUS EVERY 6 HOURS PRN
Status: DISCONTINUED | OUTPATIENT
Start: 2020-11-12 | End: 2020-11-14 | Stop reason: HOSPADM

## 2020-11-12 RX ORDER — PANTOPRAZOLE SODIUM 40 MG/1
40 TABLET, DELAYED RELEASE ORAL EVERY MORNING
Status: DISCONTINUED | OUTPATIENT
Start: 2020-11-13 | End: 2020-11-14 | Stop reason: HOSPADM

## 2020-11-12 RX ORDER — SODIUM CHLORIDE 0.9 % (FLUSH) 0.9 %
10 SYRINGE (ML) INJECTION EVERY 12 HOURS SCHEDULED
Status: DISCONTINUED | OUTPATIENT
Start: 2020-11-12 | End: 2020-11-14 | Stop reason: HOSPADM

## 2020-11-12 RX ORDER — SODIUM CHLORIDE 0.9 % (FLUSH) 0.9 %
10 SYRINGE (ML) INJECTION AS NEEDED
Status: DISCONTINUED | OUTPATIENT
Start: 2020-11-12 | End: 2020-11-14 | Stop reason: HOSPADM

## 2020-11-12 RX ORDER — LORAZEPAM 0.5 MG/1
0.5 TABLET ORAL 2 TIMES DAILY
Status: DISCONTINUED | OUTPATIENT
Start: 2020-11-12 | End: 2020-11-14 | Stop reason: HOSPADM

## 2020-11-12 RX ORDER — QUETIAPINE FUMARATE 25 MG/1
50 TABLET, FILM COATED ORAL 2 TIMES DAILY
Status: DISCONTINUED | OUTPATIENT
Start: 2020-11-12 | End: 2020-11-14 | Stop reason: HOSPADM

## 2020-11-12 RX ADMIN — SODIUM CHLORIDE 1000 ML: 9 INJECTION, SOLUTION INTRAVENOUS at 10:16

## 2020-11-12 RX ADMIN — LORAZEPAM 1 MG: 2 INJECTION INTRAMUSCULAR; INTRAVENOUS at 10:16

## 2020-11-12 RX ADMIN — ONDANSETRON 4 MG: 2 INJECTION INTRAMUSCULAR; INTRAVENOUS at 10:16

## 2020-11-12 RX ADMIN — SODIUM CHLORIDE 1000 ML: 9 INJECTION, SOLUTION INTRAVENOUS at 18:03

## 2020-11-12 RX ADMIN — QUETIAPINE FUMARATE 50 MG: 25 TABLET ORAL at 22:33

## 2020-11-12 RX ADMIN — CEFTRIAXONE SODIUM 1 G: 10 INJECTION, POWDER, FOR SOLUTION INTRAVENOUS at 12:33

## 2020-11-12 RX ADMIN — IOPAMIDOL 100 ML: 755 INJECTION, SOLUTION INTRAVENOUS at 11:49

## 2020-11-12 RX ADMIN — LORAZEPAM 0.5 MG: 0.5 TABLET ORAL at 22:33

## 2020-11-12 RX ADMIN — LORAZEPAM 1 MG: 2 INJECTION INTRAMUSCULAR; INTRAVENOUS at 13:51

## 2020-11-12 NOTE — CONSULTS
Surgery (on-call MD unless specified)  Consult performed by: Pepe Wheatley MD  Consult ordered by: Lisa Morejon PA-C  Reason for consult: possible bowel obstruction          General Surgery Consult Note      Subjective     55-year-old lady who has severe MR and is taking care of by full-time caregivers as well as her brother who was brought to the emergency department for evaluation of several days of abdominal pain.  Her brother who is here to provide the history and thinks her last bowel movement was Monday.  Over the weekend and early this week she has had a lot of nausea and emesis.  She has not been able to eat much.  She has never had symptoms like this that he knows of.  Because the progressive nature he brought her to the emergency department.  On arrival she had a soft but mildly tender abdomen.  Labs were obtained which do not show any significant leukocytosis or shift.  Electrolytes were in reasonably good order.  A CT scan was obtained which suggested dilated bowel with decompressed bowel distally and a possible transition point left lower quadrant suggestive of a bowel obstruction I was asked to see her for management.      History  Past Medical History:   Diagnosis Date   • Anxiety    • Dementia (CMS/HCC)    • Disease of thyroid gland    • GERD (gastroesophageal reflux disease)      Past Surgical History:   Procedure Laterality Date   • COLONOSCOPY N/A 12/3/2019    Procedure: colonoscopy with sclerotherapy of polypectomy site x1 area, polypectomy x4, fulgrated polyp x4 ;  Surgeon: Raymond Gan MD;  Location: [facility] ENDOSCOPY;  Service: Gastroenterology   • ENDOSCOPY N/A 12/3/2019    Procedure: Esophagogastroduodenoscopy with dilatation (bougie 50 cm) and biopsy x 1 area;  Surgeon: Raymond Gan MD;  Location: [facility] ENDOSCOPY;  Service: Gastroenterology   • THYROID SURGERY       History reviewed. No pertinent family history.  Social History     Tobacco Use   • Smoking status: Never Smoker   •  Smokeless tobacco: Never Used   Substance Use Topics   • Alcohol use: No     Frequency: Never   • Drug use: No     (Not in a hospital admission)    Allergies:  Orange juice    Review of Systems   Unobtainable due to the patient's mental status  Objective     Vital Signs  Temp:  [97 °F (36.1 °C)] 97 °F (36.1 °C)  Heart Rate:  [83] 83  Resp:  [16] 16  BP: (103-124)/(62-82) 124/82    Physical Exam  Constitutional:       Appearance: Normal appearance.      Comments: Will awaken to voice   HENT:      Head: Normocephalic and atraumatic.      Nose: No congestion or rhinorrhea.   Eyes:      General: No scleral icterus.     Conjunctiva/sclera: Conjunctivae normal.   Neck:      Musculoskeletal: No muscular tenderness.   Cardiovascular:      Rate and Rhythm: Normal rate.      Pulses: Normal pulses.   Pulmonary:      Effort: No respiratory distress.      Breath sounds: No wheezing.   Abdominal:      Palpations: Abdomen is soft.      Comments: Mild distention without significant tenderness to palpation no skin changes no evidence of hernia   Musculoskeletal:         General: No swelling or tenderness.   Lymphadenopathy:      Cervical: No cervical adenopathy.   Skin:     General: Skin is warm and dry.   Neurological:      General: No focal deficit present.      Mental Status: Mental status is at baseline.   Psychiatric:         Mood and Affect: Mood normal.         Behavior: Behavior normal.         Results Review:  Lab Results (last 24 hours)     Procedure Component Value Units Date/Time    Extra Tubes [[id number]] Collected: 11/12/20 1015    Specimen: Blood, Venous Line Updated: 11/12/20 1116    Narrative:      The following orders were created for panel order Extra Tubes.  Procedure                               Abnormality         Status                     ---------                               -----------         ------                     Light Blue Top[[id number]]                                   Final result                 Gold Top - SST[[id number]]                                   Final result               Green Top (Gel)[[id number]]                                  Final result                 Please view results for these tests on the individual orders.    Green Top (Gel) [[id number]] Collected: 11/12/20 1015    Specimen: Blood Updated: 11/12/20 1116     Extra Tube Hold for add-ons.     Comment: Auto resulted.       Light Blue Top [[id number]] Collected: 11/12/20 1015    Specimen: Blood Updated: 11/12/20 1116     Extra Tube hold for add-on     Comment: Auto resulted       Gold Top - SST [[id number]] Collected: 11/12/20 1015    Specimen: Blood Updated: 11/12/20 1116     Extra Tube Hold for add-ons.     Comment: Auto resulted.       Urinalysis, Microscopic Only - Urine, Catheter In/Out [[id number]]  (Abnormal) Collected: 11/12/20 1017    Specimen: Urine, Catheter In/Out Updated: 11/12/20 1056     RBC, UA 6-12 /HPF      WBC, UA 13-20 /HPF      Bacteria, UA Trace /HPF      Squamous Epithelial Cells, UA 3-6 /HPF      Hyaline Casts, UA 3-6 /LPF      Methodology Manual Light Microscopy    Urine Culture - Urine, Urine, Catheter In/Out [[id number]] Collected: 11/12/20 1017    Specimen: Urine, Catheter In/Out Updated: 11/12/20 1056    Urinalysis With Culture If Indicated - Urine, Catheter In/Out [[id number]]  (Abnormal) Collected: 11/12/20 1017    Specimen: Urine, Catheter In/Out Updated: 11/12/20 1047     Color, UA Dark Yellow     Comment: Result checked        Appearance, UA Clear     pH, UA 5.5     Specific Gravity, UA >=1.030     Glucose, UA Negative     Ketones, UA 40 mg/dL (2+)     Bilirubin, UA Small (1+)     Comment: Confirmation testing is unavailable.  A serum bilirubin is recommended for further assessment.        Blood, UA Moderate (2+)     Protein, UA 30 mg/dL (1+)     Leuk Esterase, UA Small (1+)     Nitrite, UA Negative     Urobilinogen, UA 1.0 E.U./dL    Comprehensive Metabolic Panel [[id number]]  (Abnormal) Collected: 11/12/20  1015    Specimen: Blood Updated: 11/12/20 1046     Glucose 94 mg/dL      BUN 33 mg/dL      Creatinine 0.88 mg/dL      Sodium 140 mmol/L      Potassium 3.6 mmol/L      Chloride 101 mmol/L      CO2 26.0 mmol/L      Calcium 9.0 mg/dL      Total Protein 7.2 g/dL      Albumin 4.10 g/dL      ALT (SGPT) 20 U/L      AST (SGOT) 17 U/L      Alkaline Phosphatase 87 U/L      Total Bilirubin 0.6 mg/dL      eGFR Non African Amer 67 mL/min/1.73      Globulin 3.1 gm/dL      A/G Ratio 1.3 g/dL      BUN/Creatinine Ratio 37.5     Anion Gap 13.0 mmol/L     Narrative:      GFR Normal >60  Chronic Kidney Disease <60  Kidney Failure <15      Lipase [[id number]]  (Normal) Collected: 11/12/20 1015    Specimen: Blood Updated: 11/12/20 1046     Lipase 22 U/L     CBC & Differential [[id number]]  (Abnormal) Collected: 11/12/20 1015    Specimen: Blood Updated: 11/12/20 1025    Narrative:      The following orders were created for panel order CBC & Differential.  Procedure                               Abnormality         Status                     ---------                               -----------         ------                     CBC Auto Differential[[id number]]        Abnormal            Final result                 Please view results for these tests on the individual orders.    CBC Auto Differential [[id number]]  (Abnormal) Collected: 11/12/20 1015    Specimen: Blood Updated: 11/12/20 1025     WBC 5.30 10*3/mm3      RBC 4.08 10*6/mm3      Hemoglobin 12.0 g/dL      Hematocrit 36.6 %      MCV 89.6 fL      MCH 29.3 pg      MCHC 32.7 g/dL      RDW 13.5 %      RDW-SD 42.4 fl      MPV 10.2 fL      Platelets 176 10*3/mm3      Neutrophil % 74.6 %      Lymphocyte % 14.2 %      Monocyte % 10.5 %      Eosinophil % 0.5 %      Basophil % 0.2 %      Neutrophils, Absolute 3.90 10*3/mm3      Lymphocytes, Absolute 0.70 10*3/mm3      Monocytes, Absolute 0.60 10*3/mm3      Eosinophils, Absolute 0.00 10*3/mm3      Basophils, Absolute 0.00 10*3/mm3      nRBC 0.1  /100 WBC         Imaging Results (Last 24 Hours)     Procedure Component Value Units Date/Time    CT Abdomen Pelvis With Contrast [[id number]] Collected: 11/12/20 1150     Updated: 11/12/20 1203    Narrative:      CT ABDOMEN PELVIS W CONTRAST-     Date of Exam: 11/12/2020 11:36 AM     Indication: LLQ pain history of diverticulitis.     Comparison: None available.     Technique: CT scan of the abdomen and pelvis was performed after the  uneventful administration of 100 mL IV Isovue 370.  Automated exposure  control and iterative reconstruction methods were used.     FINDINGS:  Within the lung bases is left basilar atelectasis.     The liver, gallbladder, adrenal glands, kidneys, spleen, and pancreas  are unremarkable.     The stomach appears normal. There are multiple dilated loops of small  bowel with suggestion of a transition point in the mid abdomen. The  colon appears normal. The appendix appears normal. There is no  pneumoperitoneum. There is no ascites or loculated collection. No  abnormally enlarged lymph nodes are identified.     The rectum, uterus, and urinary bladder are unremarkable.     No aggressive osseous lesions are identified. There is levoscoliosis of  the upper lumbar spine.       Impression:      Multiple dilated loops of small bowel with suggestion of transition  point in mid abdomen, concerning for small bowel obstruction.     Electronically Signed By-Lucien Castillo MD On:11/12/2020 11:58 AM  This report was finalized on [id number] by  Lucien Castillo MD.          I reviewed the patient's other test results and agree with the interpretation  I reviewed the patient's new imaging results and agree with the interpretation.    Assessment/Plan     Active Problems:    SBO (small bowel obstruction) (CMS/HCC)    Patient with some abdominal pain nausea and vomiting CT scan suggesting bowel obstruction.  Last bowel movement was a couple of days ago.  No surgical history is known.  She did have a  colonoscopy back in 2019.  Based on her abdominal exam being quite benign her labs unremarkable will give her a chance that a nonoperative course.  Will attempt nasogastric tube placement if possible if it causes too much discomfort okay to leave out.  We will possibly do a small bowel study tomorrow.  IV fluids n.p.o. sips and chips for comfort    This note was created using Dragon Voice Recognition software.    Pepe Wheatley MD  11/12/20  14:00 EST

## 2020-11-12 NOTE — ED PROVIDER NOTES
Subjective   History:  Patient is a 55-year-old female who presents to the ER with left lower quadrant pain nausea vomiting and decreased appetite over the last several days that started on Sunday.  They report that she went to urgent care and was told it was most likely a viral infection but brother reports that she still has been unable to tolerate p.o.  No history of abdominal pain or surgery.    Onset: 3 to 4 days  Location: Lower abdominal  Duration: Constant  Character: Pain with nausea and vomiting  Aggravating/Alleviating factors: None  Radiation none  Severity: Moderate            Review of Systems   Reason unable to perform ROS: Baseline developmental delay making ROS difficult.   Constitutional: Negative for chills, diaphoresis, fatigue and fever.   Respiratory: Negative for cough, choking and shortness of breath.    Cardiovascular: Negative for chest pain.   Gastrointestinal: Positive for abdominal pain, nausea and vomiting.   Genitourinary: Negative.    Musculoskeletal: Negative.    Skin: Negative.    Neurological: Negative.    Psychiatric/Behavioral: Negative.        Past Medical History:   Diagnosis Date   • Anxiety    • Dementia (CMS/HCC)    • Disease of thyroid gland    • GERD (gastroesophageal reflux disease)        Allergies   Allergen Reactions   • Orange Juice Shortness Of Breath       Past Surgical History:   Procedure Laterality Date   • COLONOSCOPY N/A 12/3/2019    Procedure: colonoscopy with sclerotherapy of polypectomy site x1 area, polypectomy x4, fulgrated polyp x4 ;  Surgeon: Raymond Gan MD;  Location: [facility] ENDOSCOPY;  Service: Gastroenterology   • ENDOSCOPY N/A 12/3/2019    Procedure: Esophagogastroduodenoscopy with dilatation (bougie 50 cm) and biopsy x 1 area;  Surgeon: Raymond Gan MD;  Location: [facility] ENDOSCOPY;  Service: Gastroenterology   • THYROID SURGERY         History reviewed. No pertinent family history.    Social History     Socioeconomic History   • Marital status:  Single     Spouse name: Not on file   • Number of children: Not on file   • Years of education: Not on file   • Highest education level: Not on file   Tobacco Use   • Smoking status: Never Smoker   • Smokeless tobacco: Never Used   Substance and Sexual Activity   • Alcohol use: No     Frequency: Never   • Drug use: No   • Sexual activity: Defer           Objective   Physical Exam  Vitals signs and nursing note reviewed.   Constitutional:       Appearance: She is well-developed.   HENT:      Head: Normocephalic and atraumatic.      Nose: Nose normal.   Eyes:      Pupils: Pupils are equal, round, and reactive to light.   Neck:      Musculoskeletal: Normal range of motion.   Pulmonary:      Effort: Pulmonary effort is normal.   Abdominal:      General: Abdomen is protuberant. Bowel sounds are normal.      Palpations: Abdomen is soft.      Tenderness: There is abdominal tenderness in the left lower quadrant.   Musculoskeletal: Normal range of motion.   Skin:     General: Skin is warm and dry.   Neurological:      General: No focal deficit present.      Mental Status: She is alert and oriented to person, place, and time.   Psychiatric:         Mood and Affect: Mood normal.         Behavior: Behavior normal.         Thought Content: Thought content normal.         Judgment: Judgment normal.         Procedures           ED Course          Ct Abdomen Pelvis With Contrast    Result Date: 11/12/2020  Multiple dilated loops of small bowel with suggestion of transition point in mid abdomen, concerning for small bowel obstruction.  Electronically Signed By-Lucien Castillo MD On:11/12/2020 11:58 AM This report was finalized on [id number] by  Lucien Castillo MD.    Labs Reviewed   COMPREHENSIVE METABOLIC PANEL - Abnormal; Notable for the following components:       Result Value    BUN 33 (*)     BUN/Creatinine Ratio 37.5 (*)     All other components within normal limits    Narrative:     GFR Normal >60  Chronic Kidney  Disease <60  Kidney Failure <15     URINALYSIS W/ CULTURE IF INDICATED - Abnormal; Notable for the following components:    Color, UA Dark Yellow (*)     Ketones, UA 40 mg/dL (2+) (*)     Bilirubin, UA Small (1+) (*)     Blood, UA Moderate (2+) (*)     Protein, UA 30 mg/dL (1+) (*)     Leuk Esterase, UA Small (1+) (*)     All other components within normal limits   CBC WITH AUTO DIFFERENTIAL - Abnormal; Notable for the following components:    Lymphocyte % 14.2 (*)     All other components within normal limits   URINALYSIS, MICROSCOPIC ONLY - Abnormal; Notable for the following components:    RBC, UA 6-12 (*)     WBC, UA 13-20 (*)     Bacteria, UA Trace (*)     Squamous Epithelial Cells, UA 3-6 (*)     All other components within normal limits   LIPASE - Normal   URINE CULTURE   CBC AND DIFFERENTIAL    Narrative:     The following orders were created for panel order CBC & Differential.  Procedure                               Abnormality         Status                     ---------                               -----------         ------                     CBC Auto Differential[[id number]]        Abnormal            Final result                 Please view results for these tests on the individual orders.   EXTRA TUBES    Narrative:     The following orders were created for panel order Extra Tubes.  Procedure                               Abnormality         Status                     ---------                               -----------         ------                     Light Blue Top[[id number]]                                   Final result               Gold Top - SST[[id number]]                                   Final result               Green Top (Gel)[[id number]]                                  Final result                 Please view results for these tests on the individual orders.   LIGHT BLUE TOP   GOLD TOP - SST   GREEN TOP     Medications   sodium chloride 0.9 % flush 10 mL (has no administration in time  range)   sodium chloride 0.9 % bolus 1,000 mL (0 mL Intravenous Stopped 11/12/20 1202)   ondansetron (ZOFRAN) injection 4 mg (4 mg Intravenous Given 11/12/20 1016)   LORazepam (ATIVAN) injection 1 mg (1 mg Intravenous Given 11/12/20 1016)   iopamidol (ISOVUE-370) 76 % injection 100 mL (100 mL Intravenous Given 11/12/20 1149)   cefTRIAXone (ROCEPHIN) in SWFI 1 gram/10ml IV PUSH syringe (1 g Intravenous Given 11/12/20 1233)                                       MDM  Number of Diagnoses or Management Options  Acute cystitis with hematuria:   Intractable vomiting with nausea, unspecified vomiting type:   SBO (small bowel obstruction) (CMS/HCC):   Diagnosis management comments: I examined the patient using the appropriate personal protective equipment.      DISPOSITION:   Chart Review:  Comorbidity:  has a past medical history of Anxiety, Dementia (CMS/HCC), Disease of thyroid gland, and GERD (gastroesophageal reflux disease).  Differentials:this list is not all inclusive and does not constitute the entirety of considered causes --> small bowel obstruction diverticulitis, UTI, nephrolithiasis gastroenteritis  Labs: UA had trace bacteria moderate amount of ketones    Imaging: Was interpreted by physician and reviewed by myself:  Ct Abdomen Pelvis With Contrast    Result Date: 11/12/2020  Multiple dilated loops of small bowel with suggestion of transition point in mid abdomen, concerning for small bowel obstruction.  Electronically Signed By-Lucien Castillo MD On:11/12/2020 11:58 AM This report was finalized on [id number] by  Lucien Castillo MD.      Disposition/Treatment:  Patient is a 55-year-old female presents to the ER with intractable nausea vomiting generalized abdominal pain and discomfort.  Patient has a small bowel obstruction NG tube was ordered was given Rocephin for UTI.  I spoke with her primary care provider and she was admitted to him.  I spoke with general surgery who will also see her.  She was  stable in the ER her caregiver was in agreement with plan         Amount and/or Complexity of Data Reviewed  Clinical lab tests: reviewed  Tests in the radiology section of CPT®: reviewed    Patient Progress  Patient progress: stable      Final diagnoses:   SBO (small bowel obstruction) (CMS/HCC)   Intractable vomiting with nausea, unspecified vomiting type   Acute cystitis with hematuria            Lisa Morejon PA-C  11/12/20 5271

## 2020-11-13 ENCOUNTER — APPOINTMENT (OUTPATIENT)
Dept: GENERAL RADIOLOGY | Facility: HOSPITAL | Age: 55
End: 2020-11-13

## 2020-11-13 PROCEDURE — 74250 X-RAY XM SM INT 1CNTRST STD: CPT

## 2020-11-13 PROCEDURE — 99232 SBSQ HOSP IP/OBS MODERATE 35: CPT | Performed by: SURGERY

## 2020-11-13 PROCEDURE — 25010000002 ONDANSETRON PER 1 MG: Performed by: FAMILY MEDICINE

## 2020-11-13 RX ADMIN — ONDANSETRON 4 MG: 2 INJECTION INTRAMUSCULAR; INTRAVENOUS at 09:38

## 2020-11-13 RX ADMIN — VENLAFAXINE HYDROCHLORIDE 75 MG: 75 CAPSULE, EXTENDED RELEASE ORAL at 09:38

## 2020-11-13 RX ADMIN — Medication 10 ML: at 20:28

## 2020-11-13 RX ADMIN — PANTOPRAZOLE SODIUM 40 MG: 40 TABLET, DELAYED RELEASE ORAL at 06:16

## 2020-11-13 RX ADMIN — QUETIAPINE FUMARATE 50 MG: 25 TABLET ORAL at 09:38

## 2020-11-13 RX ADMIN — Medication 10 ML: at 09:51

## 2020-11-13 RX ADMIN — QUETIAPINE FUMARATE 50 MG: 25 TABLET ORAL at 20:28

## 2020-11-13 RX ADMIN — LORAZEPAM 0.5 MG: 0.5 TABLET ORAL at 09:38

## 2020-11-13 RX ADMIN — LORAZEPAM 0.5 MG: 0.5 TABLET ORAL at 20:28

## 2020-11-13 RX ADMIN — LEVOTHYROXINE SODIUM 100 MCG: 100 TABLET ORAL at 06:16

## 2020-11-13 RX ADMIN — CETIRIZINE HYDROCHLORIDE 10 MG: 10 TABLET, FILM COATED ORAL at 09:38

## 2020-11-13 NOTE — PROGRESS NOTES
General Surgery Progress Note     LOS: 1 day   Patient Care Team:  Esther Morris MD as PCP - General  Esther Morris MD as PCP - Family Medicine    Subjective     Interval History:   Nasogastric tube was briefly placed minimal if any output as it was pulled back out.  No significant nausea or vomiting overnight.  This morning she has by herself and is asking for something to drink.  She does not suggest that she has any abdominal pain.    Objective     Vital Signs  Temp:  [97 °F (36.1 °C)-98.6 °F (37 °C)] 97.6 °F (36.4 °C)  Heart Rate:  [57-83] 79  Resp:  [16-18] 16  BP: ()/(51-82) 92/61    Physical Exam  Constitutional:       Appearance: Normal appearance.   HENT:      Head: Normocephalic and atraumatic.   Eyes:      General: No scleral icterus.     Conjunctiva/sclera: Conjunctivae normal.   Cardiovascular:      Rate and Rhythm: Normal rate.   Pulmonary:      Effort: Pulmonary effort is normal. No respiratory distress.   Abdominal:      Comments: Abdomen is soft nondistended is minimally tender to palpation   Skin:     General: Skin is warm and dry.   Neurological:      Mental Status: She is alert. Mental status is at baseline.            Results Review:    Lab Results (last 24 hours)     Procedure Component Value Units Date/Time    Extra Tubes [[id number]] Collected: 11/12/20 1015    Specimen: Blood, Venous Line Updated: 11/12/20 1116    Narrative:      The following orders were created for panel order Extra Tubes.  Procedure                               Abnormality         Status                     ---------                               -----------         ------                     Light Blue Top[[id number]]                                   Final result               Gold Top - SST[[id number]]                                   Final result               Green Top (Gel)[[id number]]                                  Final result                 Please view results for these tests on the individual orders.      Green Top (Gel) [[id number]] Collected: 11/12/20 1015    Specimen: Blood Updated: 11/12/20 1116     Extra Tube Hold for add-ons.     Comment: Auto resulted.       Light Blue Top [[id number]] Collected: 11/12/20 1015    Specimen: Blood Updated: 11/12/20 1116     Extra Tube hold for add-on     Comment: Auto resulted       Gold Top - SST [[id number]] Collected: 11/12/20 1015    Specimen: Blood Updated: 11/12/20 1116     Extra Tube Hold for add-ons.     Comment: Auto resulted.       Urinalysis, Microscopic Only - Urine, Catheter In/Out [[id number]]  (Abnormal) Collected: 11/12/20 1017    Specimen: Urine, Catheter In/Out Updated: 11/12/20 1056     RBC, UA 6-12 /HPF      WBC, UA 13-20 /HPF      Bacteria, UA Trace /HPF      Squamous Epithelial Cells, UA 3-6 /HPF      Hyaline Casts, UA 3-6 /LPF      Methodology Manual Light Microscopy    Urine Culture - Urine, Urine, Catheter In/Out [[id number]] Collected: 11/12/20 1017    Specimen: Urine, Catheter In/Out Updated: 11/12/20 1056    Urinalysis With Culture If Indicated - Urine, Catheter In/Out [[id number]]  (Abnormal) Collected: 11/12/20 1017    Specimen: Urine, Catheter In/Out Updated: 11/12/20 1047     Color, UA Dark Yellow     Comment: Result checked        Appearance, UA Clear     pH, UA 5.5     Specific Gravity, UA >=1.030     Glucose, UA Negative     Ketones, UA 40 mg/dL (2+)     Bilirubin, UA Small (1+)     Comment: Confirmation testing is unavailable.  A serum bilirubin is recommended for further assessment.        Blood, UA Moderate (2+)     Protein, UA 30 mg/dL (1+)     Leuk Esterase, UA Small (1+)     Nitrite, UA Negative     Urobilinogen, UA 1.0 E.U./dL    Comprehensive Metabolic Panel [[id number]]  (Abnormal) Collected: 11/12/20 1015    Specimen: Blood Updated: 11/12/20 1046     Glucose 94 mg/dL      BUN 33 mg/dL      Creatinine 0.88 mg/dL      Sodium 140 mmol/L      Potassium 3.6 mmol/L      Chloride 101 mmol/L      CO2 26.0 mmol/L      Calcium 9.0 mg/dL      Total  Protein 7.2 g/dL      Albumin 4.10 g/dL      ALT (SGPT) 20 U/L      AST (SGOT) 17 U/L      Alkaline Phosphatase 87 U/L      Total Bilirubin 0.6 mg/dL      eGFR Non African Amer 67 mL/min/1.73      Globulin 3.1 gm/dL      A/G Ratio 1.3 g/dL      BUN/Creatinine Ratio 37.5     Anion Gap 13.0 mmol/L     Narrative:      GFR Normal >60  Chronic Kidney Disease <60  Kidney Failure <15      Lipase [[id number]]  (Normal) Collected: 11/12/20 1015    Specimen: Blood Updated: 11/12/20 1046     Lipase 22 U/L     CBC & Differential [[id number]]  (Abnormal) Collected: 11/12/20 1015    Specimen: Blood Updated: 11/12/20 1025    Narrative:      The following orders were created for panel order CBC & Differential.  Procedure                               Abnormality         Status                     ---------                               -----------         ------                     CBC Auto Differential[[id number]]        Abnormal            Final result                 Please view results for these tests on the individual orders.    CBC Auto Differential [[id number]]  (Abnormal) Collected: 11/12/20 1015    Specimen: Blood Updated: 11/12/20 1025     WBC 5.30 10*3/mm3      RBC 4.08 10*6/mm3      Hemoglobin 12.0 g/dL      Hematocrit 36.6 %      MCV 89.6 fL      MCH 29.3 pg      MCHC 32.7 g/dL      RDW 13.5 %      RDW-SD 42.4 fl      MPV 10.2 fL      Platelets 176 10*3/mm3      Neutrophil % 74.6 %      Lymphocyte % 14.2 %      Monocyte % 10.5 %      Eosinophil % 0.5 %      Basophil % 0.2 %      Neutrophils, Absolute 3.90 10*3/mm3      Lymphocytes, Absolute 0.70 10*3/mm3      Monocytes, Absolute 0.60 10*3/mm3      Eosinophils, Absolute 0.00 10*3/mm3      Basophils, Absolute 0.00 10*3/mm3      nRBC 0.1 /100 WBC         Imaging Results (Last 24 Hours)     Procedure Component Value Units Date/Time    CT Abdomen Pelvis With Contrast [[id number]] Collected: 11/12/20 1150     Updated: 11/12/20 1203    Narrative:      CT ABDOMEN PELVIS W  CONTRAST-     Date of Exam: 11/12/2020 11:36 AM     Indication: LLQ pain history of diverticulitis.     Comparison: None available.     Technique: CT scan of the abdomen and pelvis was performed after the  uneventful administration of 100 mL IV Isovue 370.  Automated exposure  control and iterative reconstruction methods were used.     FINDINGS:  Within the lung bases is left basilar atelectasis.     The liver, gallbladder, adrenal glands, kidneys, spleen, and pancreas  are unremarkable.     The stomach appears normal. There are multiple dilated loops of small  bowel with suggestion of a transition point in the mid abdomen. The  colon appears normal. The appendix appears normal. There is no  pneumoperitoneum. There is no ascites or loculated collection. No  abnormally enlarged lymph nodes are identified.     The rectum, uterus, and urinary bladder are unremarkable.     No aggressive osseous lesions are identified. There is levoscoliosis of  the upper lumbar spine.       Impression:      Multiple dilated loops of small bowel with suggestion of transition  point in mid abdomen, concerning for small bowel obstruction.     Electronically Signed By-Lucien Castillo MD On:11/12/2020 11:58 AM  This report was finalized on [id number] by  Lucien Castillo MD.         I reviewed the patient's new clinical results.    Medication Review:    Current Facility-Administered Medications:   •  calcium carbonate (TUMS) chewable tablet 500 mg (200 mg elemental), 2 tablet, Oral, BID PRN, Esther Morris MD  •  cetirizine (zyrTEC) tablet 10 mg, 10 mg, Oral, Daily, Esther Morris MD  •  levothyroxine (SYNTHROID, LEVOTHROID) tablet 100 mcg, 100 mcg, Oral, Q AM, Esther Morris MD, 100 mcg at 11/13/20 0616  •  LORazepam (ATIVAN) tablet 0.5 mg, 0.5 mg, Oral, BID, Esther Morris MD, 0.5 mg at 11/12/20 1116  •  magnesium hydroxide (MILK OF MAGNESIA) suspension 2400 mg/10mL 10 mL, 10 mL, Oral, Daily PRN, Esther Morris MD  •  ondansetron  (ZOFRAN) injection 4 mg, 4 mg, Intravenous, Q6H PRN, Esther Morris MD  •  pantoprazole (PROTONIX) EC tablet 40 mg, 40 mg, Oral, QAM, Esther Morirs MD, 40 mg at 11/13/20 0616  •  QUEtiapine (SEROquel) tablet 50 mg, 50 mg, Oral, BID, Esther Morris MD, 50 mg at 11/12/20 2233  •  sennosides-docusate (PERICOLACE) 8.6-50 MG per tablet 2 tablet, 2 tablet, Oral, BID PRN, Esther Morris MD  •  [COMPLETED] Insert peripheral IV, , , Once **AND** sodium chloride 0.9 % flush 10 mL, 10 mL, Intravenous, PRN, Esther Morris MD  •  sodium chloride 0.9 % flush 10 mL, 10 mL, Intravenous, Q12H, Esther Morris MD  •  sodium chloride 0.9 % flush 10 mL, 10 mL, Intravenous, PRN, Esther Morris MD  •  venlafaxine XR (EFFEXOR-XR) 24 hr capsule 75 mg, 75 mg, Oral, Daily, Esther Morris MD    Assessment/Plan     Active Problems:    SBO (small bowel obstruction) (CMS/HCC)    No major changes overnight.  Benign abdominal exam benign labs yesterday.  My suspicion is that this is more likely either some constipation ileus or enteritis and not a true mechanical bowel obstruction.  We will try to get a small bowel follow-through today to better characterize this as ileus versus obstruction.  This may also be therapeutic if it is more related to constipation.  We will try to coordinate this with her family being present to help with this study as it may be a bit difficult with her mental status.      If small bowel follow-through is normal we will likely advance diet and anticipate discharge home.  If there is a true mechanical bowel obstruction will consider laparoscopy versus laparotomy based on these images.    This note was created using Dragon Voice Recognition software.    Pepe Wheatley MD  11/13/20  07:45 EST

## 2020-11-13 NOTE — H&P
Patient Care Team:  Esther Morris MD as PCP - General  Esther Morris MD as PCP - Family Medicine    Chief complaint small bowel obstruction  Subjective     Patient is a 55 y.o.  female with severe mental retardation and generalized anxiety disorder some behavior issues was brought by the caregiver Vandana Knott to the emergency room because of abdominal pain and vomiting.  Very difficult to get a good history from the patient.  She was seen in the emergency room a CT of the abdomen pelvis indicated that she has small bowel obstruction.  Was kept n.p.o. she was started on IV fluids.  She did not have an NG tube placed.  Today she had a small bowel follow-through that shows a slow transit but no obstructions no mass-effect.  She has a started on some liquid and advance to full liquid diet.  She just had a large bowel movement.  Before the bowel movement she had some abdominal pain but she says after the large bowel movement her abdominal pain has completely resolved.  She is attended in the room by her brother.    Review of Systems   Constitutional: Positive for activity change and appetite change.   Gastrointestinal: Positive for abdominal pain, constipation, nausea and vomiting.   Endocrine: Negative.    Genitourinary: Negative.    Musculoskeletal: Negative.    Skin: Negative.    Allergic/Immunologic: Negative.    Neurological: Negative.    Psychiatric/Behavioral: Positive for agitation, behavioral problems and confusion. The patient is nervous/anxious.           History  Past Medical History:   Diagnosis Date   • Anxiety    • Dementia (CMS/HCC)    • Disease of thyroid gland    • GERD (gastroesophageal reflux disease)    • Mentally disabled      Past Surgical History:   Procedure Laterality Date   • COLONOSCOPY N/A 12/3/2019    Procedure: colonoscopy with sclerotherapy of polypectomy site x1 area, polypectomy x4, fulgrated polyp x4 ;  Surgeon: Raymond Gan MD;  Location: [facility] ENDOSCOPY;  Service:  "Gastroenterology   • ENDOSCOPY N/A 12/3/2019    Procedure: Esophagogastroduodenoscopy with dilatation (bougie 50 cm) and biopsy x 1 area;  Surgeon: Raymond Gan MD;  Location: [facility] ENDOSCOPY;  Service: Gastroenterology   • THYROID SURGERY       History reviewed. No pertinent family history.  Social History     Tobacco Use   • Smoking status: Never Smoker   • Smokeless tobacco: Never Used   Substance Use Topics   • Alcohol use: No     Frequency: Never   • Drug use: No     Medications Prior to Admission   Medication Sig Dispense Refill Last Dose   • levothyroxine (SYNTHROID, LEVOTHROID) 100 MCG tablet Take 100 mcg by mouth Daily.      • Loratadine 10 MG capsule Take 1 tablet by mouth Daily.      • LORazepam (ATIVAN) 0.5 MG tablet Take 0.5 mg by mouth 2 (Two) Times a Day.      • Memantine HCl-Donepezil HCl (NAMZARIC) 14-10 MG capsule sustained-release 24 hr Take 1 tablet by mouth Daily.      • omeprazole (priLOSEC) 40 MG capsule Take 40 mg by mouth Daily.      • QUEtiapine (SEROquel) 50 MG tablet Take 50 mg by mouth 2 (Two) Times a Day. Takes at noon and 10pm      • venlafaxine XR (EFFEXOR-XR) 75 MG 24 hr capsule Take 75 mg by mouth Daily.        Allergies:  Orange juice    Objective     Vital Signs  BP 92/61   Pulse 79   Temp 97.6 °F (36.4 °C) (Axillary)   Resp 16   Ht 165.1 cm (65\")   Wt 73 kg (160 lb 14.4 oz)   SpO2 100%   BMI 26.78 kg/m²       Physical Exam:      General Appearance:    Alert, cooperative, in no acute distress   Head:    Normocephalic, without obvious abnormality, atraumatic   Eyes:            Lids and lashes normal, conjunctivae and sclerae normal, no   icterus, no pallor, corneas clear, PERRLA   Ears:    Ears appear intact with no abnormalities noted   Throat:   No oral lesions, no thrush, oral mucosa moist   Neck:   No adenopathy, supple, trachea midline, no thyromegaly, no   carotid bruit, no JVD   Lungs:     Clear to auscultation,respirations regular, even and                  " unlabored    Heart:    Regular rhythm and normal rate, normal S1 and S2, no            murmur, no gallop, no rub, no click   Chest Wall:    No abnormalities observed   Abdomen:     Normal bowel sounds, no masses, no organomegaly, soft        non-tender, non-distended, no guarding, no rebound                tenderness   Extremities:   Moves all extremities well, no edema, no cyanosis, no             redness   Pulses:   Pulses palpable and equal bilaterally   Skin:   No bleeding, bruising or rash   Lymph nodes:   No palpable adenopathy   Neurologic:   Cranial nerves 2 - 12 grossly intact, sensation intact, DTR       present and equal bilaterally       Results Review:     Imaging Results (Last 24 Hours)     Procedure Component Value Units Date/Time    FL Small Bowel Follow Through Single-Contrast [[id number]] Collected: 11/13/20 1416     Updated: 11/13/20 1421    Narrative:      DATE OF EXAM:  11/13/2020 1:04 PM     PROCEDURE:  FL SMALL BOWEL FOLLOW THROUGH SINGLE-CONTRAST-     INDICATIONS:  sbo; K56.609-Unspecified intestinal obstruction, unspecified as to  partial versus complete obstruction; R11.2-Nausea with vomiting,  unspecified; N30.01-Acute cystitis with hematuria     COMPARISON:  CT abdomen and pelvis dated 11/12/2020     TECHNIQUE:    image of the abdomen was obtained. Patient ingested water-soluble  single contrast imaging of the small bowel.  Examination was recorded  with multiple large field of view radiographs and spot fluoroscopic  images.     Fluoroscopic Time:   50 seconds     FINDINGS:   examination demonstrates clear lung bases. There are dilated loops  of small bowel within the abdomen likely related to small bowel  obstruction. There is gas and stool present within the colon.     Overhead images demonstrate progression of enteric contrast into the  small bowel with contrast reaching the colon by 2 hours and 30 minutes,  slightly slightly longer than normal small bowel transit time.  The  previously seen degree of small bowel distention appears decreased by  the end of the examination. There is no evidence of high-grade small  bowel obstruction.Patient tolerated only a limited spot compression  images of the abdomen.       Impression:      1. No evidence of high-grade small bowel obstruction with contrast  reaching the colon by 2 hours and 30 minutes.  2. Decrease in the degree of small bowel distention when compared to the  CT and  radiograph.     Electronically Signed By-Raudel Cosme MD On:11/13/2020 2:19 PM  This report was finalized on [id number] by  Raudel Cosme MD.             ECG/EMG Results (last 24 hours)     ** No results found for the last 24 hours. **            Lab Results (last 24 hours)     Procedure Component Value Units Date/Time    Urine Culture - Urine, Urine, Catheter In/Out [[id number]]  (Abnormal) Collected: 11/12/20 1017    Specimen: Urine, Catheter In/Out Updated: 11/13/20 1139     Urine Culture 25,000 CFU/mL Gram Negative Bacilli           I reviewed the patient's new clinical results.    Assessment/Plan        Partial small bowel obstruction resolved  Constipation resolved  Mental retardation with behavioral problems  Recurrent urine tract infection  Bruising on the right knee  Family reports problem with swallowing      Active Problems:    SBO (small bowel obstruction) (CMS/HCC)    Will advance the diet.  She has had a good bowel movement she does not have any nausea or vomiting at this time.  I will ask speech therapist to evaluate her swallowing.  Hopefully back to home tomorrow    I discussed the patients findings and my recommendations with patient.     Esther Morris MD  11/13/20  18:23 EST

## 2020-11-13 NOTE — PLAN OF CARE
Goal Outcome Evaluation:  Plan of Care Reviewed With: patient  Progress: no change  Outcome Summary: pt doing okay. pt has been resting most of the night. VSS. pt pulled out NG tube and did want want it to be replaced. will continue to monitor.

## 2020-11-13 NOTE — PROGRESS NOTES
Discharge Planning Assessment  [facility]     Patient Name: Erna Shine  MRN: [id number]  Today's Date: 11/13/2020    Admit Date: 11/12/2020    Discharge Needs Assessment     Row Name 11/13/20 1322       Living Environment    Lives With  other (see comments)    Unique Family Situation  Lives with other people    Current Living Arrangements  home/apartment/condo    Primary Care Provided by  other (see comments)    Provides Primary Care For  no one, unable/limited ability to care for self    Family Caregiver if Needed  friend(s)    Quality of Family Relationships  helpful;involved;supportive    Able to Return to Prior Arrangements  yes       Resource/Environmental Concerns    Resource/Environmental Concerns  none    Transportation Concerns  car, none       Transition Planning    Patient/Family Anticipates Transition to  home with family    Patient/Family Anticipated Services at Transition  none    Transportation Anticipated  family or friend will provide       Discharge Needs Assessment    Readmission Within the Last 30 Days  no previous admission in last 30 days    Current Outpatient/Agency/Support Group  other (see comments)    Equipment Currently Used at Home  none    Concerns to be Addressed  no discharge needs identified;denies needs/concerns at this time    Anticipated Changes Related to Illness  inability to care for self    Equipment Needed After Discharge  none    Provided Post Acute Provider List?  N/A    Provided Post Acute Provider Quality & Resource List?  N/A    Discharge Coordination/Progress  Spoke with patient's brother, no needs identified at this time.        Discharge Plan     Row Name 11/13/20 1328       Plan    Plan  Spoke with patient's brother, no needs identified at this time.    Provided Post Acute Provider List?  N/A    Provided Post Acute Provider Quality & Resource List?  N/A    Patient/Family in Agreement with Plan  yes    Plan Comments  Spoke with patient's brother over the phone.  Patient lives with other people but the brother is extremely involved in patient's care. Brother says that patient's can accomplish task with verbal cues.  attempted to call caregiver, no answer. Left message asking for a return call. Anticipate discharge possibly today.        Continued Care and Services - Admitted Since 11/12/2020    Coordination has not been started for this encounter.       Expected Discharge Date and Time     Expected Discharge Date Expected Discharge Time    Nov 13, 2020           Patient Forms     Row Name 11/13/20 1334       Patient Forms    Important Message from Medicare ([facility])  Delivered received IM 11/12              Anna Naegele RN Case Manager  [address]  [address]   [phone]  office  [phone]  fax  [email or web address]  [facility]

## 2020-11-14 VITALS
SYSTOLIC BLOOD PRESSURE: 116 MMHG | RESPIRATION RATE: 14 BRPM | HEIGHT: 65 IN | HEART RATE: 88 BPM | TEMPERATURE: 97.9 F | DIASTOLIC BLOOD PRESSURE: 81 MMHG | WEIGHT: 164.2 LBS | BODY MASS INDEX: 27.36 KG/M2 | OXYGEN SATURATION: 100 %

## 2020-11-14 LAB — BACTERIA SPEC AEROBE CULT: ABNORMAL

## 2020-11-14 PROCEDURE — 99231 SBSQ HOSP IP/OBS SF/LOW 25: CPT | Performed by: SURGERY

## 2020-11-14 RX ORDER — POLYETHYLENE GLYCOL 3350 17 G/17G
17 POWDER, FOR SOLUTION ORAL DAILY
Qty: 30 PACKET | Refills: 11 | Status: SHIPPED | OUTPATIENT
Start: 2020-11-14 | End: 2020-12-14

## 2020-11-14 RX ADMIN — PANTOPRAZOLE SODIUM 40 MG: 40 TABLET, DELAYED RELEASE ORAL at 05:42

## 2020-11-14 RX ADMIN — CETIRIZINE HYDROCHLORIDE 10 MG: 10 TABLET, FILM COATED ORAL at 09:05

## 2020-11-14 RX ADMIN — QUETIAPINE FUMARATE 50 MG: 25 TABLET ORAL at 09:06

## 2020-11-14 RX ADMIN — LORAZEPAM 0.5 MG: 0.5 TABLET ORAL at 09:06

## 2020-11-14 RX ADMIN — VENLAFAXINE HYDROCHLORIDE 75 MG: 75 CAPSULE, EXTENDED RELEASE ORAL at 09:05

## 2020-11-14 RX ADMIN — Medication 10 ML: at 09:06

## 2020-11-14 RX ADMIN — LEVOTHYROXINE SODIUM 100 MCG: 100 TABLET ORAL at 05:42

## 2020-11-14 NOTE — PROGRESS NOTES
GENERAL SURGERY PROGRESS NOTE.    Patient in bathroom when I rounded this AM. Unable to perform physical exam. Vitals reviewed.  Per RN no complaints of abdominal pain/distention  Stooling. Tolerating diet without N/V.  SBFT demonstrated no evidence of SBO    PLAN:  ADAT  No need for surgical intervention  Please call if we can be of assistance    Steven Naidu MD  11/14/2020  11:09 EST

## 2020-11-14 NOTE — PLAN OF CARE
Goal Outcome Evaluation:  Plan of Care Reviewed With: patient  Progress: improving  Patient did well with full liquid diet, no nausea or complaints of abd pain, family at bedside til hs, plan is to discharge later today

## 2020-11-14 NOTE — PLAN OF CARE
Goal Outcome Evaluation:  Plan of Care Reviewed With: patient, caregiver  Progress: improving  Patient ate well, had a BM, and hasn't had any nausea or vomiting. Patient being discharged.

## 2020-11-15 NOTE — DISCHARGE SUMMARY
Date of Discharge:  11/15/2020    Discharge Diagnosis:     Partial small bowel obstruction resolved  Constipation resolved  Mental retardation with behavioral problems  Recurrent urine tract infection  Bruising on the right knee  Family reports problem with swallowing    Presenting Problem/History of Present Illness  Active Hospital Problems    Diagnosis  POA   • SBO (small bowel obstruction) (CMS/[facility]) [K56.609]  Yes      Resolved Hospital Problems   No resolved problems to display.          Hospital Course    Patient is a 55 y.o.  female with severe mental retardation and generalized anxiety disorder some behavior issues was brought by the caregiver Vandana Knott to the emergency room because of abdominal pain and vomiting.  Very difficult to get a good history from the patient.  She was seen in the emergency room a CT of the abdomen pelvis indicated that she has small bowel obstruction.  Was kept n.p.o. she was started on IV fluids.  She did not have an NG tube placed.  Today she had a small bowel follow-through that shows a slow transit but no obstructions no mass-effect.  She has a started on some liquid and advance to full liquid diet.  She just had a large bowel movement.  Before the bowel movement she had some abdominal pain but she says after the large bowel movement her abdominal pain has completely resolved.  She is attended in the room by her brother.  The patient was in the hospital room for 72 hours.  On the second day she was a started on clear liquids and it was advanced to full liquid for the lunch on the day of discharge she had regular diet and she tolerated the diet very well.  He was discharged to home medicines will be the same except for addition of MiraLAX 17 g/day.    Procedures Performed         Consults:   Consults     Date and Time Order Name Status Description    11/12/2020 1212 Surgery (on-call MD unless specified) Completed     11/12/2020 1212 Hospitalist (on-call MD unless  specified) Completed           Pertinent Test Results:    Lab Results (most recent)     Procedure Component Value Units Date/Time    Urine Culture - Urine, Urine, Catheter In/Out [[id number]]  (Abnormal)  (Susceptibility) Collected: 11/12/20 1017    Specimen: Urine, Catheter In/Out Updated: 11/14/20 0319     Urine Culture 25,000 CFU/mL Escherichia coli    Susceptibility      Escherichia coli     SAMMY     Ampicillin Resistant     Ampicillin + Sulbactam Resistant     Cefazolin Susceptible     Cefepime Susceptible     Ceftazidime Susceptible     Ceftriaxone Susceptible     Gentamicin Susceptible     Levofloxacin Susceptible     Nitrofurantoin Susceptible     Piperacillin + Tazobactam Susceptible     Tetracycline Susceptible     Trimethoprim + Sulfamethoxazole Susceptible                    Extra Tubes [[id number]] Collected: 11/12/20 1015    Specimen: Blood, Venous Line Updated: 11/12/20 1116    Narrative:      The following orders were created for panel order Extra Tubes.  Procedure                               Abnormality         Status                     ---------                               -----------         ------                     Light Blue Top[[id number]]                                   Final result               Gold Top - SST[[id number]]                                   Final result               Green Top (Gel)[[id number]]                                  Final result                 Please view results for these tests on the individual orders.    Green Top (Gel) [[id number]] Collected: 11/12/20 1015    Specimen: Blood Updated: 11/12/20 1116     Extra Tube Hold for add-ons.     Comment: Auto resulted.       Light Blue Top [[id number]] Collected: 11/12/20 1015    Specimen: Blood Updated: 11/12/20 1116     Extra Tube hold for add-on     Comment: Auto resulted       Gold Top - SST [[id number]] Collected: 11/12/20 1015    Specimen: Blood Updated: 11/12/20 1116     Extra Tube Hold for add-ons.     Comment: Auto  resulted.       Urinalysis, Microscopic Only - Urine, Catheter In/Out [[id number]]  (Abnormal) Collected: 11/12/20 1017    Specimen: Urine, Catheter In/Out Updated: 11/12/20 1056     RBC, UA 6-12 /HPF      WBC, UA 13-20 /HPF      Bacteria, UA Trace /HPF      Squamous Epithelial Cells, UA 3-6 /HPF      Hyaline Casts, UA 3-6 /LPF      Methodology Manual Light Microscopy    Urinalysis With Culture If Indicated - Urine, Catheter In/Out [[id number]]  (Abnormal) Collected: 11/12/20 1017    Specimen: Urine, Catheter In/Out Updated: 11/12/20 1047     Color, UA Dark Yellow     Comment: Result checked        Appearance, UA Clear     pH, UA 5.5     Specific Gravity, UA >=1.030     Glucose, UA Negative     Ketones, UA 40 mg/dL (2+)     Bilirubin, UA Small (1+)     Comment: Confirmation testing is unavailable.  A serum bilirubin is recommended for further assessment.        Blood, UA Moderate (2+)     Protein, UA 30 mg/dL (1+)     Leuk Esterase, UA Small (1+)     Nitrite, UA Negative     Urobilinogen, UA 1.0 E.U./dL    Comprehensive Metabolic Panel [[id number]]  (Abnormal) Collected: 11/12/20 1015    Specimen: Blood Updated: 11/12/20 1046     Glucose 94 mg/dL      BUN 33 mg/dL      Creatinine 0.88 mg/dL      Sodium 140 mmol/L      Potassium 3.6 mmol/L      Chloride 101 mmol/L      CO2 26.0 mmol/L      Calcium 9.0 mg/dL      Total Protein 7.2 g/dL      Albumin 4.10 g/dL      ALT (SGPT) 20 U/L      AST (SGOT) 17 U/L      Alkaline Phosphatase 87 U/L      Total Bilirubin 0.6 mg/dL      eGFR Non African Amer 67 mL/min/1.73      Globulin 3.1 gm/dL      A/G Ratio 1.3 g/dL      BUN/Creatinine Ratio 37.5     Anion Gap 13.0 mmol/L     Narrative:      GFR Normal >60  Chronic Kidney Disease <60  Kidney Failure <15      Lipase [[id number]]  (Normal) Collected: 11/12/20 1015    Specimen: Blood Updated: 11/12/20 1046     Lipase 22 U/L     CBC & Differential [[id number]]  (Abnormal) Collected: 11/12/20 1015    Specimen: Blood Updated: 11/12/20 1025      Narrative:      The following orders were created for panel order CBC & Differential.  Procedure                               Abnormality         Status                     ---------                               -----------         ------                     CBC Auto Differential[[id number]]        Abnormal            Final result                 Please view results for these tests on the individual orders.    CBC Auto Differential [[id number]]  (Abnormal) Collected: 11/12/20 1015    Specimen: Blood Updated: 11/12/20 1025     WBC 5.30 10*3/mm3      RBC 4.08 10*6/mm3      Hemoglobin 12.0 g/dL      Hematocrit 36.6 %      MCV 89.6 fL      MCH 29.3 pg      MCHC 32.7 g/dL      RDW 13.5 %      RDW-SD 42.4 fl      MPV 10.2 fL      Platelets 176 10*3/mm3      Neutrophil % 74.6 %      Lymphocyte % 14.2 %      Monocyte % 10.5 %      Eosinophil % 0.5 %      Basophil % 0.2 %      Neutrophils, Absolute 3.90 10*3/mm3      Lymphocytes, Absolute 0.70 10*3/mm3      Monocytes, Absolute 0.60 10*3/mm3      Eosinophils, Absolute 0.00 10*3/mm3      Basophils, Absolute 0.00 10*3/mm3      nRBC 0.1 /100 WBC            Imaging Results (Last 72 Hours)     Procedure Component Value Units Date/Time    FL Small Bowel Follow Through Single-Contrast [[id number]] Collected: 11/13/20 1416     Updated: 11/13/20 1421    Narrative:      DATE OF EXAM:  11/13/2020 1:04 PM     PROCEDURE:  FL SMALL BOWEL FOLLOW THROUGH SINGLE-CONTRAST-     INDICATIONS:  sbo; K56.609-Unspecified intestinal obstruction, unspecified as to  partial versus complete obstruction; R11.2-Nausea with vomiting,  unspecified; N30.01-Acute cystitis with hematuria     COMPARISON:  CT abdomen and pelvis dated 11/12/2020     TECHNIQUE:    image of the abdomen was obtained. Patient ingested water-soluble  single contrast imaging of the small bowel.  Examination was recorded  with multiple large field of view radiographs and spot fluoroscopic  images.     Fluoroscopic Time:   50  seconds     FINDINGS:   examination demonstrates clear lung bases. There are dilated loops  of small bowel within the abdomen likely related to small bowel  obstruction. There is gas and stool present within the colon.     Overhead images demonstrate progression of enteric contrast into the  small bowel with contrast reaching the colon by 2 hours and 30 minutes,  slightly slightly longer than normal small bowel transit time. The  previously seen degree of small bowel distention appears decreased by  the end of the examination. There is no evidence of high-grade small  bowel obstruction.Patient tolerated only a limited spot compression  images of the abdomen.       Impression:      1. No evidence of high-grade small bowel obstruction with contrast  reaching the colon by 2 hours and 30 minutes.  2. Decrease in the degree of small bowel distention when compared to the  CT and  radiograph.     Electronically Signed By-Raudel Cosme MD On:11/13/2020 2:19 PM  This report was finalized on [id number] by  Raudel Cosme MD.    CT Abdomen Pelvis With Contrast [[id number]] Collected: 11/12/20 1150     Updated: 11/12/20 1203    Narrative:      CT ABDOMEN PELVIS W CONTRAST-     Date of Exam: 11/12/2020 11:36 AM     Indication: LLQ pain history of diverticulitis.     Comparison: None available.     Technique: CT scan of the abdomen and pelvis was performed after the  uneventful administration of 100 mL IV Isovue 370.  Automated exposure  control and iterative reconstruction methods were used.     FINDINGS:  Within the lung bases is left basilar atelectasis.     The liver, gallbladder, adrenal glands, kidneys, spleen, and pancreas  are unremarkable.     The stomach appears normal. There are multiple dilated loops of small  bowel with suggestion of a transition point in the mid abdomen. The  colon appears normal. The appendix appears normal. There is no  pneumoperitoneum. There is no ascites or loculated  "collection. No  abnormally enlarged lymph nodes are identified.     The rectum, uterus, and urinary bladder are unremarkable.     No aggressive osseous lesions are identified. There is levoscoliosis of  the upper lumbar spine.       Impression:      Multiple dilated loops of small bowel with suggestion of transition  point in mid abdomen, concerning for small bowel obstruction.     Electronically Signed By-Lucien Castillo MD On:11/12/2020 11:58 AM  This report was finalized on [id number] by  Lucien Castillo MD.                  ECG/EMG Results (last 24 hours)     ** No results found for the last 24 hours. **          Pulmonary Functions Testing Results:    No results found for: FEV1, FVC, ZAJ5AQX, TLC, DLCO       Condition on Discharge: Condition resolved  Vital Signs  /81 (BP Location: Right arm, Patient Position: Sitting)   Pulse 88   Temp 97.9 °F (36.6 °C) (Oral)   Resp 14   Ht 165.1 cm (65\")   Wt 74.5 kg (164 lb 3.2 oz)   SpO2 100%   BMI 27.32 kg/m²       Physical Exam:     General Appearance:    Alert, she is anxious as usual.   Head:    Normocephalic, without obvious abnormality, atraumatic   Eyes:            Lids and lashes normal, conjunctivae and sclerae normal, no   icterus, no pallor, corneas clear, PERRLA   Ears:    Ears appear intact with no abnormalities noted   Throat:   No oral lesions, no thrush, oral mucosa moist   Neck:   No adenopathy, supple, trachea midline, no thyromegaly, no   carotid bruit, no JVD   Lungs:     Clear to auscultation,respirations regular, even and                  unlabored    Heart:    Regular rhythm and normal rate, normal S1 and S2, no            murmur, no gallop, no rub, no click   Chest Wall:    No abnormalities observed   Abdomen:     Normal bowel sounds, no masses, no organomegaly, soft        non-tender, non-distended, no guarding, no rebound                tenderness   Extremities:   Moves all extremities well, no edema, no cyanosis, no             " redness, she had a small bruise ecchymosis on the right and her knee her knee joint had full range of motion.   Pulses:   Pulses palpable and equal bilaterally   Skin:   No bleeding, bruising or rash   Lymph nodes:   No palpable adenopathy   Neurologic:   Cranial nerves 2 - 12 grossly intact, sensation intact, DTR       present and equal bilaterally       Discharge Disposition  Home or Self Care    Discharge Medications     Discharge Medications      New Medications      Instructions Start Date   polyethylene glycol 17 g packet  Commonly known as: MIRALAX   17 g, Oral, Daily         Continue These Medications      Instructions Start Date   levothyroxine 100 MCG tablet  Commonly known as: SYNTHROID, LEVOTHROID   100 mcg, Oral, Daily      Loratadine 10 MG capsule   1 tablet, Oral, Daily      LORazepam 0.5 MG tablet  Commonly known as: ATIVAN   0.5 mg, Oral, 2 Times Daily      Namzaric 14-10 MG capsule sustained-release 24 hr  Generic drug: Memantine HCl-Donepezil HCl   1 tablet, Oral, Daily      omeprazole 40 MG capsule  Commonly known as: priLOSEC   40 mg, Oral, Daily      QUEtiapine 50 MG tablet  Commonly known as: SEROquel   50 mg, Oral, 2 Times Daily, Takes at noon and 10pm       venlafaxine XR 75 MG 24 hr capsule  Commonly known as: EFFEXOR-XR   75 mg, Oral, Daily             Discharge Diet: Regular    Activity at Discharge: Tolerated    Follow-up Appointments    Follow-up in 10 days with primary care physician    Test Results Pending at Discharge       Esther Morris MD  11/15/20  12:00 EST    Time: [id number]

## 2020-11-16 NOTE — PROGRESS NOTES
Continued Stay Note   Scotty     Patient Name: Erna Shine  MRN: [id number]  Today's Date: 11/16/2020    Admit Date: 11/12/2020    Discharge Plan     Row Name 11/16/20 0818       Plan    Final Discharge Disposition Code  01 - home or self-care            Expected Discharge Date and Time     Expected Discharge Date Expected Discharge Time    Nov 14, 2020  6:03 PM            Erna Harding RN

## 2021-03-23 NOTE — PLAN OF CARE
Goal Outcome Evaluation:  Plan of Care Reviewed With: patient  Progress: improving   Patients mother at bedside, will accompany to SBFT.  Patient is doing well and is cooperative in taking her medications and with providing care.   Patient is to request medication for fungus on her toes. Patient made appointment for next available 4/28/21., but still wants a refill for her medication. Patient would like to speak to a nurse. Please advise.

## 2021-04-20 ENCOUNTER — OFFICE VISIT (OUTPATIENT)
Dept: FAMILY MEDICINE CLINIC | Facility: CLINIC | Age: 56
End: 2021-04-20

## 2021-04-20 ENCOUNTER — LAB (OUTPATIENT)
Dept: FAMILY MEDICINE CLINIC | Facility: CLINIC | Age: 56
End: 2021-04-20

## 2021-04-20 VITALS
SYSTOLIC BLOOD PRESSURE: 121 MMHG | BODY MASS INDEX: 26.16 KG/M2 | TEMPERATURE: 96.8 F | HEIGHT: 65 IN | WEIGHT: 157 LBS | OXYGEN SATURATION: 98 % | DIASTOLIC BLOOD PRESSURE: 78 MMHG | HEART RATE: 91 BPM

## 2021-04-20 DIAGNOSIS — E03.9 HYPOTHYROIDISM, UNSPECIFIED TYPE: ICD-10-CM

## 2021-04-20 DIAGNOSIS — K21.9 GASTROESOPHAGEAL REFLUX DISEASE, UNSPECIFIED WHETHER ESOPHAGITIS PRESENT: ICD-10-CM

## 2021-04-20 DIAGNOSIS — F41.8 MIXED ANXIETY DEPRESSIVE DISORDER: Primary | ICD-10-CM

## 2021-04-20 DIAGNOSIS — F41.8 MIXED ANXIETY DEPRESSIVE DISORDER: ICD-10-CM

## 2021-04-20 LAB
ALBUMIN SERPL-MCNC: 4 G/DL (ref 3.5–5.2)
ALBUMIN/GLOB SERPL: 1.3 G/DL
ALP SERPL-CCNC: 97 U/L (ref 39–117)
ALT SERPL W P-5'-P-CCNC: 17 U/L (ref 1–33)
ANION GAP SERPL CALCULATED.3IONS-SCNC: 10.8 MMOL/L (ref 5–15)
AST SERPL-CCNC: 17 U/L (ref 1–32)
BASOPHILS # BLD AUTO: 0.01 10*3/MM3 (ref 0–0.2)
BASOPHILS NFR BLD AUTO: 0.2 % (ref 0–1.5)
BILIRUB SERPL-MCNC: 0.3 MG/DL (ref 0–1.2)
BUN SERPL-MCNC: 22 MG/DL (ref 6–20)
BUN/CREAT SERPL: 28.6 (ref 7–25)
CALCIUM SPEC-SCNC: 9 MG/DL (ref 8.6–10.5)
CHLORIDE SERPL-SCNC: 107 MMOL/L (ref 98–107)
CHOLEST SERPL-MCNC: 145 MG/DL (ref 0–200)
CO2 SERPL-SCNC: 23.2 MMOL/L (ref 22–29)
CREAT SERPL-MCNC: 0.77 MG/DL (ref 0.57–1)
DEPRECATED RDW RBC AUTO: 40.7 FL (ref 37–54)
EOSINOPHIL # BLD AUTO: 0.12 10*3/MM3 (ref 0–0.4)
EOSINOPHIL NFR BLD AUTO: 2.6 % (ref 0.3–6.2)
ERYTHROCYTE [DISTWIDTH] IN BLOOD BY AUTOMATED COUNT: 12.3 % (ref 12.3–15.4)
GFR SERPL CREATININE-BSD FRML MDRD: 78 ML/MIN/1.73
GLOBULIN UR ELPH-MCNC: 3.1 GM/DL
GLUCOSE SERPL-MCNC: 78 MG/DL (ref 65–99)
HCT VFR BLD AUTO: 38.9 % (ref 34–46.6)
HDLC SERPL-MCNC: 48 MG/DL (ref 40–60)
HGB BLD-MCNC: 12.4 G/DL (ref 12–15.9)
IMM GRANULOCYTES # BLD AUTO: 0.02 10*3/MM3 (ref 0–0.05)
IMM GRANULOCYTES NFR BLD AUTO: 0.4 % (ref 0–0.5)
LDLC SERPL CALC-MCNC: 85 MG/DL (ref 0–100)
LDLC/HDLC SERPL: 1.77 {RATIO}
LYMPHOCYTES # BLD AUTO: 1.14 10*3/MM3 (ref 0.7–3.1)
LYMPHOCYTES NFR BLD AUTO: 24.7 % (ref 19.6–45.3)
MCH RBC QN AUTO: 29.2 PG (ref 26.6–33)
MCHC RBC AUTO-ENTMCNC: 31.9 G/DL (ref 31.5–35.7)
MCV RBC AUTO: 91.5 FL (ref 79–97)
MONOCYTES # BLD AUTO: 0.28 10*3/MM3 (ref 0.1–0.9)
MONOCYTES NFR BLD AUTO: 6.1 % (ref 5–12)
NEUTROPHILS NFR BLD AUTO: 3.04 10*3/MM3 (ref 1.7–7)
NEUTROPHILS NFR BLD AUTO: 66 % (ref 42.7–76)
NRBC BLD AUTO-RTO: 0 /100 WBC (ref 0–0.2)
PLATELET # BLD AUTO: 151 10*3/MM3 (ref 140–450)
PMV BLD AUTO: 11.9 FL (ref 6–12)
POTASSIUM SERPL-SCNC: 4 MMOL/L (ref 3.5–5.2)
PROT SERPL-MCNC: 7.1 G/DL (ref 6–8.5)
RBC # BLD AUTO: 4.25 10*6/MM3 (ref 3.77–5.28)
SODIUM SERPL-SCNC: 141 MMOL/L (ref 136–145)
TRIGL SERPL-MCNC: 60 MG/DL (ref 0–150)
TSH SERPL DL<=0.05 MIU/L-ACNC: 1.11 UIU/ML (ref 0.27–4.2)
VLDLC SERPL-MCNC: 12 MG/DL (ref 5–40)
WBC # BLD AUTO: 4.61 10*3/MM3 (ref 3.4–10.8)

## 2021-04-20 PROCEDURE — 80061 LIPID PANEL: CPT | Performed by: FAMILY MEDICINE

## 2021-04-20 PROCEDURE — 80053 COMPREHEN METABOLIC PANEL: CPT | Performed by: FAMILY MEDICINE

## 2021-04-20 PROCEDURE — 85025 COMPLETE CBC W/AUTO DIFF WBC: CPT | Performed by: FAMILY MEDICINE

## 2021-04-20 PROCEDURE — 36415 COLL VENOUS BLD VENIPUNCTURE: CPT

## 2021-04-20 PROCEDURE — 99204 OFFICE O/P NEW MOD 45 MIN: CPT | Performed by: FAMILY MEDICINE

## 2021-04-20 PROCEDURE — 84443 ASSAY THYROID STIM HORMONE: CPT | Performed by: FAMILY MEDICINE

## 2021-04-20 RX ORDER — LORAZEPAM 0.5 MG/1
0.5 TABLET ORAL 2 TIMES DAILY
Qty: 60 TABLET | Refills: 0 | Status: SHIPPED | OUTPATIENT
Start: 2021-04-20 | End: 2021-05-28 | Stop reason: SDUPTHER

## 2021-04-20 RX ORDER — LORATADINE 10 MG/1
1 TABLET ORAL DAILY
COMMUNITY
Start: 2021-03-26 | End: 2022-08-02 | Stop reason: SDUPTHER

## 2021-04-20 RX ORDER — QUETIAPINE FUMARATE 50 MG/1
50 TABLET, FILM COATED ORAL 2 TIMES DAILY
Qty: 60 TABLET | Refills: 2 | Status: SHIPPED | OUTPATIENT
Start: 2021-04-20 | End: 2021-04-27

## 2021-04-20 NOTE — PROGRESS NOTES
Subjective   Erna Shien is a 56 y.o. female.     Anxiety  Presents for initial visit. Onset was 1 to 5 years ago. The problem has been gradually worsening. Symptoms include depressed mood, malaise and nervous/anxious behavior. Patient reports no chest pain, confusion, decreased concentration, dizziness, insomnia, nausea, palpitations, restlessness, shortness of breath or suicidal ideas. Symptoms occur most days. The severity of symptoms is mild. The quality of sleep is fair.       Heartburn  She complains of heartburn. She reports no abdominal pain, no chest pain, no coughing, no dysphagia, no hoarse voice, no nausea, no sore throat or no wheezing. This is a chronic problem. The current episode started more than 1 year ago. The problem occurs frequently. The problem has been waxing and waning. The heartburn is of moderate intensity. The heartburn does not wake her from sleep. The heartburn does not limit her activity. The symptoms are aggravated by certain foods. Associated symptoms include fatigue. She has tried a PPI and a diet change for the symptoms. The treatment provided mild relief.    The patient also has history of  hypothyroidism.  She complains of fatigue but denies chest pain, palpitations, shortness of breath, trouble swallowing, anxiety, nervousness, dry skin and hair loss.  Since the last visit, the patient states she is taking medication as directed      The following portions of the patient's history were reviewed and updated as appropriate: past medical history, past social history, past surgical history and problem list.    Review of Systems   Constitutional: Positive for fatigue. Negative for activity change and fever.   HENT: Negative for hoarse voice, rhinorrhea, sore throat and trouble swallowing.    Respiratory: Negative for cough, shortness of breath and wheezing.    Cardiovascular: Negative for chest pain and palpitations.   Gastrointestinal: Negative for abdominal pain, dysphagia,  nausea and vomiting.   Neurological: Negative for dizziness and confusion.   Psychiatric/Behavioral: Positive for depressed mood. Negative for decreased concentration and suicidal ideas. The patient is nervous/anxious. The patient does not have insomnia.        Objective   Physical Exam  Vitals reviewed.   Constitutional:       General: She is not in acute distress.     Appearance: She is well-developed.   Cardiovascular:      Rate and Rhythm: Normal rate.      Pulses: Normal pulses.      Heart sounds: Normal heart sounds.   Pulmonary:      Effort: Pulmonary effort is normal.      Breath sounds: Normal breath sounds. No wheezing.   Abdominal:      General: Bowel sounds are normal.      Palpations: Abdomen is soft.      Tenderness: There is no abdominal tenderness.   Musculoskeletal:         General: Normal range of motion.      Cervical back: Normal range of motion and neck supple. No tenderness.   Neurological:      Mental Status: She is alert and oriented to person, place, and time.   Psychiatric:         Mood and Affect: Mood normal.       Vitals:    04/20/21 0843   BP: 121/78   Pulse: 91   Temp: 96.8 °F (36 °C)   SpO2: 98%   '  Current Outpatient Medications on File Prior to Visit   Medication Sig Dispense Refill   • Allergy Relief 10 MG tablet Take 1 tablet by mouth Daily.     • levothyroxine (SYNTHROID, LEVOTHROID) 100 MCG tablet Take 100 mcg by mouth Daily.     • Loratadine 10 MG capsule Take 1 tablet by mouth Daily.     • Memantine HCl-Donepezil HCl (NAMZARIC) 14-10 MG capsule sustained-release 24 hr Take 1 tablet by mouth Daily.     • omeprazole (priLOSEC) 40 MG capsule Take 40 mg by mouth Daily.     • venlafaxine XR (EFFEXOR-XR) 75 MG 24 hr capsule Take 75 mg by mouth Daily.       No current facility-administered medications on file prior to visit.           Assessment/Plan   Problems Addressed this Visit        Endocrine and Metabolic    Hypothyroidism      continue current dose of levothyroxine.   we  will check TSH level         Relevant Orders    Comprehensive metabolic panel (Completed)    Lipid panel (Completed)    CBC w AUTO Differential (Completed)    TSH (Completed)       Gastrointestinal Abdominal     GERD (gastroesophageal reflux disease)       Discussed dietary changes and lifestyle modifications.         Relevant Orders    Comprehensive metabolic panel (Completed)    CBC w AUTO Differential (Completed)       Mental Health    Mixed anxiety depressive disorder - Primary     Anxiety depression unchanged -discussed psych referral and antidepressive medications.  The patient verified not suicidal at this time, she will call our office if there is  any worsening of Sx or thoughts of doing harm arise.           Relevant Medications    LORazepam (ATIVAN) 0.5 MG tablet    QUEtiapine (SEROquel) 50 MG tablet    Other Relevant Orders    TSH (Completed)    Ambulatory Referral to Psychiatry      Diagnoses       Codes Comments    Mixed anxiety depressive disorder    -  Primary ICD-10-CM: F41.8  ICD-9-CM: 300.4     Hypothyroidism, unspecified type     ICD-10-CM: E03.9  ICD-9-CM: 244.9     Gastroesophageal reflux disease, unspecified whether esophagitis present     ICD-10-CM: K21.9  ICD-9-CM: 530.81

## 2021-04-22 NOTE — ASSESSMENT & PLAN NOTE
Anxiety depression unchanged -discussed psych referral and antidepressive medications.  The patient verified not suicidal at this time, she will call our office if there is  any worsening of Sx or thoughts of doing harm arise.

## 2021-04-27 ENCOUNTER — TELEPHONE (OUTPATIENT)
Dept: FAMILY MEDICINE CLINIC | Facility: CLINIC | Age: 56
End: 2021-04-27

## 2021-04-27 RX ORDER — QUETIAPINE FUMARATE 100 MG/1
100 TABLET, FILM COATED ORAL 3 TIMES DAILY
Qty: 90 TABLET | Refills: 3 | Status: SHIPPED | OUTPATIENT
Start: 2021-04-27 | End: 2021-07-12 | Stop reason: SDUPTHER

## 2021-04-27 NOTE — TELEPHONE ENCOUNTER
PATIENT'S MOTHER CALLED STATING THAT THE REFILL THAT DR. MCKEON SENT OVER FOR THE QUEtiapine (SEROquel) 50 MG tablet IS INCORRECT. THE PATIENT HAD THE DOSAGE INCREASED BEFORE SHE SWITCHED OVER TO DR. MCKEON TO 100MG 3 X DAY.    PATIENT'S MOTHER WOULD LIKE A NEW PRESCRIPTION SENT OVER REFLECTING THE CHANGES.    100 MG AT MORNING, NOON, AND BEDTIME.    PATIENT WILL RUN OUT OF MEDICATION SOON BASED OFF OF THE CURRENT MEDICATION, AND WOULD LIKE CLINICAL ADVICE ON HOW TO USE THE CURRENT 50 MG TWICE A DAY PRESCRIPTION THAT SHE JUST PICKED UP.    PLEASE ADVISE  [phone]     [facility], [place] -[id number] - [place], [place] - [address] - [phone]  - [phone]   [phone]

## 2021-05-28 DIAGNOSIS — F41.8 MIXED ANXIETY DEPRESSIVE DISORDER: ICD-10-CM

## 2021-05-28 NOTE — TELEPHONE ENCOUNTER
MANDI , PATIENT'S CAREGIVER IS CALLING TO CHECK THE STATUS OF A NEW RX  REQUEST OF THE FOLLOWING MEDICATION.  MANDI STATES PATIENT ONLY HAS ONE DOSE REMAINING.     LORazepam (ATIVAN) 0.5 MG tablet     [facility] - [place], [place] -[id number] - [place], [place] - [address] - [phone]  - [phone]   [phone]    PLEASE ADVISE.

## 2021-05-31 RX ORDER — LORAZEPAM 0.5 MG/1
0.5 TABLET ORAL 2 TIMES DAILY
Qty: 60 TABLET | Refills: 0 | Status: SHIPPED | OUTPATIENT
Start: 2021-05-31 | End: 2021-07-15

## 2021-06-10 ENCOUNTER — TELEPHONE (OUTPATIENT)
Dept: FAMILY MEDICINE CLINIC | Facility: CLINIC | Age: 56
End: 2021-06-10

## 2021-06-10 DIAGNOSIS — R41.3 MEMORY IMPAIRMENT: Primary | ICD-10-CM

## 2021-06-10 NOTE — TELEPHONE ENCOUNTER
Psych referral was for anxiety depression which she need to continue see [facility].  I have put a referral order to neurology for memory impairment/dementia.

## 2021-06-10 NOTE — TELEPHONE ENCOUNTER
Caller: MANDI DUARTE    Relationship to patient: Emergency Contact    Best call back number: [phone]    Patient is needing: PATIENT WAS REFERRED TO PSYCH. PATIENT WENT TO [facility] ON [address] CARE GIVER WAS ADVISED THAT THIS PRACTICE DOES NOT TREAT ALZHEIMER'S OR DEMENTIA. REQUESTING A CALL BACK TO DISCUSS WHAT ELSE NEEDS TO HAPPEN

## 2021-07-12 RX ORDER — QUETIAPINE FUMARATE 100 MG/1
100 TABLET, FILM COATED ORAL 3 TIMES DAILY
Qty: 90 TABLET | Refills: 0 | Status: SHIPPED | OUTPATIENT
Start: 2021-07-12 | End: 2022-01-13

## 2021-07-14 DIAGNOSIS — F41.8 MIXED ANXIETY DEPRESSIVE DISORDER: ICD-10-CM

## 2021-07-15 RX ORDER — LORAZEPAM 0.5 MG/1
TABLET ORAL
Qty: 60 TABLET | Refills: 0 | Status: SHIPPED | OUTPATIENT
Start: 2021-07-15 | End: 2021-09-03 | Stop reason: SDUPTHER

## 2021-08-12 ENCOUNTER — OFFICE VISIT (OUTPATIENT)
Dept: FAMILY MEDICINE CLINIC | Facility: CLINIC | Age: 56
End: 2021-08-12

## 2021-08-12 VITALS
TEMPERATURE: 97.7 F | HEART RATE: 115 BPM | OXYGEN SATURATION: 95 % | SYSTOLIC BLOOD PRESSURE: 115 MMHG | WEIGHT: 157 LBS | RESPIRATION RATE: 16 BRPM | HEIGHT: 65 IN | BODY MASS INDEX: 26.16 KG/M2 | DIASTOLIC BLOOD PRESSURE: 78 MMHG

## 2021-08-12 DIAGNOSIS — E03.9 HYPOTHYROIDISM, UNSPECIFIED TYPE: ICD-10-CM

## 2021-08-12 DIAGNOSIS — F41.8 MIXED ANXIETY DEPRESSIVE DISORDER: Primary | ICD-10-CM

## 2021-08-12 PROCEDURE — 99213 OFFICE O/P EST LOW 20 MIN: CPT | Performed by: FAMILY MEDICINE

## 2021-08-12 RX ORDER — ESCITALOPRAM OXALATE 10 MG/1
10 TABLET ORAL DAILY
COMMUNITY
Start: 2021-07-12 | End: 2022-01-13 | Stop reason: SDUPTHER

## 2021-08-12 RX ORDER — DONEPEZIL HYDROCHLORIDE 10 MG/1
10 TABLET, FILM COATED ORAL EVERY EVENING
COMMUNITY
Start: 2021-07-12 | End: 2022-02-09 | Stop reason: SDUPTHER

## 2021-08-12 NOTE — PROGRESS NOTES
Subjective   Erna Shine is a 56 y.o. female.     Anxiety  Presents for follow-up visit. Symptoms include depressed mood, malaise and nervous/anxious behavior. Patient reports no chest pain, decreased concentration, excessive worry, insomnia, palpitations, shortness of breath or suicidal ideas. Symptoms occur occasionally. The severity of symptoms is mild. The quality of sleep is fair.       The patient also  presents with  f/u on  hypothyroidism.  She complains of fatigue but denies chest pain, palpitations, shortness of breath, trouble swallowing, dry skin and hair loss.  Since the last visit, the patient states she is taking medication as directed.       The following portions of the patient's history were reviewed and updated as appropriate: past medical history, past social history, past surgical history and problem list.    Review of Systems   Respiratory: Negative for shortness of breath.    Cardiovascular: Negative for chest pain and palpitations.   Endocrine: Negative for cold intolerance and heat intolerance.   Psychiatric/Behavioral: Positive for depressed mood. Negative for decreased concentration and suicidal ideas. The patient is nervous/anxious. The patient does not have insomnia.        Objective   Physical Exam  Vitals reviewed.   Cardiovascular:      Heart sounds: Normal heart sounds.   Pulmonary:      Effort: Pulmonary effort is normal.      Breath sounds: Normal breath sounds.   Neurological:      Mental Status: She is alert and oriented to person, place, and time.   Psychiatric:         Mood and Affect: Mood is anxious.       Vitals:    08/12/21 0946   BP: 115/78   Pulse: 115   Resp: 16   Temp: 97.7 °F (36.5 °C)   SpO2: 95%     Current Outpatient Medications on File Prior to Visit   Medication Sig Dispense Refill   • donepezil (ARICEPT) 10 MG tablet Take 10 mg by mouth Every Evening.     • escitalopram (LEXAPRO) 10 MG tablet Take 10 mg by mouth Daily.     • levothyroxine (SYNTHROID,  LEVOTHROID) 100 MCG tablet Take 100 mcg by mouth Daily.     • Loratadine 10 MG capsule Take 1 tablet by mouth Daily.     • LORazepam (ATIVAN) 0.5 MG tablet TAKE ONE TABLET BY MOUTH TWICE DAILY 60 tablet 0   • Memantine HCl-Donepezil HCl (NAMZARIC) 14-10 MG capsule sustained-release 24 hr Take 1 tablet by mouth Daily.     • omeprazole (priLOSEC) 40 MG capsule Take 40 mg by mouth Daily.     • QUEtiapine (SEROquel) 100 MG tablet Take 1 tablet by mouth 3 (Three) Times a Day. Takes at noon and 10pm 90 tablet 0   • venlafaxine XR (EFFEXOR-XR) 75 MG 24 hr capsule Take 75 mg by mouth Daily.     • Allergy Relief 10 MG tablet Take 1 tablet by mouth Daily.       No current facility-administered medications on file prior to visit.         Assessment/Plan   Problems Addressed this Visit        Endocrine and Metabolic    Hypothyroidism     Stable continue current dose of levothyroxine.            Mental Health    Mixed anxiety depressive disorder - Primary     Symptoms are unchanged declined psych referral.  Continue current medications.  Patient verified not suicidal at this time.         Relevant Medications    escitalopram (LEXAPRO) 10 MG tablet    donepezil (ARICEPT) 10 MG tablet      Diagnoses       Codes Comments    Mixed anxiety depressive disorder    -  Primary ICD-10-CM: F41.8  ICD-9-CM: 300.4     Hypothyroidism, unspecified type     ICD-10-CM: E03.9  ICD-9-CM: 244.9

## 2021-08-19 NOTE — ASSESSMENT & PLAN NOTE
Symptoms are unchanged declined psych referral.  Continue current medications.  Patient verified not suicidal at this time.

## 2021-09-03 DIAGNOSIS — F41.8 MIXED ANXIETY DEPRESSIVE DISORDER: ICD-10-CM

## 2021-09-03 RX ORDER — LORAZEPAM 0.5 MG/1
0.5 TABLET ORAL 2 TIMES DAILY
Qty: 60 TABLET | Refills: 0 | Status: SHIPPED | OUTPATIENT
Start: 2021-09-03 | End: 2021-10-11

## 2021-09-03 RX ORDER — LORAZEPAM 0.5 MG/1
0.5 TABLET ORAL 2 TIMES DAILY
Qty: 60 TABLET | Refills: 0 | Status: CANCELLED | OUTPATIENT
Start: 2021-09-03

## 2021-09-03 NOTE — TELEPHONE ENCOUNTER
PATIENT'S CAREGIVER CALLED BACK TO CHECK ON REFILL OF LORAZEPAM. SHE SAID THAT PATIENT IS FLIPPING OUT AS SHE RAN OUT OF MEDICATION 4 DAYS AGO. SHE ASKED IF REFILL COULD BE SENT TO PHARMACY TODAY.

## 2021-09-03 NOTE — TELEPHONE ENCOUNTER
Caller: MANDI DUARTE    Relationship: Emergency Contact    Best call back number: [phone]    Medication needed:   Requested Prescriptions     Pending Prescriptions Disp Refills   • LORazepam (ATIVAN) 0.5 MG tablet 60 tablet 0     Sig: Take 1 tablet by mouth 2 (Two) Times a Day.       When do you need the refill by: ASAP    What additional details did the patient provide when requesting the medication: PATIENT IS OUT THIS MEDICATION    Does the patient have less than a 3 day supply:  [x] Yes  [] No    What is the patient's preferred pharmacy: [facility] - [place], [place] -[id number] [facility], [place] - [address] [phone] [facility] [phone]

## 2021-09-07 NOTE — TELEPHONE ENCOUNTER
Left voicemail informing the patient's caregiver that her prescription has been sent to the pharmacy.

## 2021-10-11 DIAGNOSIS — F41.8 MIXED ANXIETY DEPRESSIVE DISORDER: ICD-10-CM

## 2021-10-11 RX ORDER — LORAZEPAM 0.5 MG/1
TABLET ORAL
Qty: 60 TABLET | Refills: 0 | Status: SHIPPED | OUTPATIENT
Start: 2021-10-11 | End: 2021-11-09

## 2021-10-12 ENCOUNTER — OFFICE VISIT (OUTPATIENT)
Dept: FAMILY MEDICINE CLINIC | Facility: CLINIC | Age: 56
End: 2021-10-12

## 2021-10-12 VITALS
HEART RATE: 95 BPM | HEIGHT: 65 IN | OXYGEN SATURATION: 98 % | SYSTOLIC BLOOD PRESSURE: 109 MMHG | WEIGHT: 161.6 LBS | DIASTOLIC BLOOD PRESSURE: 81 MMHG | BODY MASS INDEX: 26.92 KG/M2 | TEMPERATURE: 97.3 F

## 2021-10-12 DIAGNOSIS — H53.9 VISION CHANGES: Primary | ICD-10-CM

## 2021-10-12 PROCEDURE — 99213 OFFICE O/P EST LOW 20 MIN: CPT | Performed by: FAMILY MEDICINE

## 2021-10-12 RX ORDER — QUETIAPINE FUMARATE 25 MG/1
25 TABLET, FILM COATED ORAL
COMMUNITY
Start: 2021-08-13 | End: 2022-01-13

## 2021-10-12 NOTE — PROGRESS NOTES
Subjective   Erna Shine is a 56 y.o. female.     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  Patient has poor communication only answer yes or no but cannot carry on  Conversation.  Per review caregiver she has noticed that patient possibly cannot see from right eye because while watching TV  she turned her head to the left and also while eating she is mostly focused from the left eye.  She denies eye pain, eye redness, discharge and any foreign body sensation.        The following portions of the patient's history were reviewed and updated as appropriate: past medical history, past social history, past surgical history and problem list.    Review of Systems   Constitutional: Negative for fever.   Eyes: Positive for visual disturbance. Negative for blurred vision, double vision, pain, discharge and redness.   Neurological: Negative for headache.       Objective   Physical Exam  Vitals reviewed.   HENT:      Head: Normocephalic and atraumatic.   Eyes:      General: No scleral icterus.        Right eye: No discharge.         Left eye: No discharge.      Extraocular Movements: Extraocular movements intact.      Conjunctiva/sclera: Conjunctivae normal.      Pupils: Pupils are equal, round, and reactive to light.   Pulmonary:      Effort: Pulmonary effort is normal.   Neurological:      Mental Status: She is alert.       Vitals:    10/12/21 1011   BP: 109/81   Pulse: 95   Temp: 97.3 °F (36.3 °C)   SpO2: 98%     Current Outpatient Medications on File Prior to Visit   Medication Sig Dispense Refill   • Allergy Relief 10 MG tablet Take 1 tablet by mouth Daily.     • donepezil (ARICEPT) 10 MG tablet Take 10 mg by mouth Every Evening.     • escitalopram (LEXAPRO) 10 MG tablet Take 10 mg by mouth Daily.     • levothyroxine (SYNTHROID, LEVOTHROID) 100 MCG tablet Take 100 mcg by mouth Daily.     • Loratadine 10 MG capsule Take 1 tablet by mouth Daily.     •  LORazepam (ATIVAN) 0.5 MG tablet TAKE ONE TABLET BY MOUTH TWICE DAILY 60 tablet 0   • Memantine HCl-Donepezil HCl (NAMZARIC) 14-10 MG capsule sustained-release 24 hr Take 1 tablet by mouth Daily.     • omeprazole (priLOSEC) 40 MG capsule Take 40 mg by mouth Daily.     • QUEtiapine (SEROquel) 100 MG tablet Take 1 tablet by mouth 3 (Three) Times a Day. Takes at noon and 10pm 90 tablet 0   • QUEtiapine (SEROquel) 25 MG tablet Take 25 mg by mouth every night at bedtime.     • venlafaxine XR (EFFEXOR-XR) 75 MG 24 hr capsule Take 75 mg by mouth Daily.       No current facility-administered medications on file prior to visit.           Assessment/Plan   Problems Addressed this Visit        Eye    Vision changes - Primary     Patient is a poor historian and mostly non verbal, since caregiver and family has noticed changes in the vision from right eye because  patient  turn head to the left to look on screen or look at food while eating.  I will refer to ophthalmology for further evaluation.         Relevant Orders    Ambulatory Referral to Ophthalmology      Diagnoses       Codes Comments    Vision changes    -  Primary ICD-10-CM: H53.9  ICD-9-CM: 368.9

## 2021-10-18 PROBLEM — H53.9 VISION CHANGES: Status: ACTIVE | Noted: 2021-10-18

## 2021-10-18 NOTE — ASSESSMENT & PLAN NOTE
Patient is a poor historian and mostly non verbal, since caregiver and family has noticed changes in the vision from right eye because  patient  turn head to the left to look on screen or look at food while eating.  I will refer to ophthalmology for further evaluation.

## 2021-11-09 DIAGNOSIS — F41.8 MIXED ANXIETY DEPRESSIVE DISORDER: ICD-10-CM

## 2021-11-09 RX ORDER — LORAZEPAM 0.5 MG/1
TABLET ORAL
Qty: 60 TABLET | Refills: 0 | Status: SHIPPED | OUTPATIENT
Start: 2021-11-09 | End: 2021-12-10

## 2021-12-10 DIAGNOSIS — F41.8 MIXED ANXIETY DEPRESSIVE DISORDER: ICD-10-CM

## 2021-12-10 RX ORDER — LORAZEPAM 0.5 MG/1
TABLET ORAL
Qty: 60 TABLET | Refills: 0 | Status: SHIPPED | OUTPATIENT
Start: 2021-12-10 | End: 2022-01-13 | Stop reason: SDUPTHER

## 2022-01-05 RX ORDER — VENLAFAXINE HYDROCHLORIDE 75 MG/1
CAPSULE, EXTENDED RELEASE ORAL
Qty: 30 CAPSULE | Refills: 0 | Status: SHIPPED | OUTPATIENT
Start: 2022-01-05 | End: 2022-01-13 | Stop reason: SDUPTHER

## 2022-01-05 RX ORDER — MEMANTINE HYDROCHLORIDE AND DONEPEZIL HYDROCHLORIDE 14; 10 MG/1; MG/1
CAPSULE ORAL
Qty: 30 CAPSULE | Refills: 0 | Status: SHIPPED | OUTPATIENT
Start: 2022-01-05 | End: 2022-02-09

## 2022-01-13 ENCOUNTER — LAB (OUTPATIENT)
Dept: FAMILY MEDICINE CLINIC | Facility: CLINIC | Age: 57
End: 2022-01-13

## 2022-01-13 ENCOUNTER — OFFICE VISIT (OUTPATIENT)
Dept: FAMILY MEDICINE CLINIC | Facility: CLINIC | Age: 57
End: 2022-01-13

## 2022-01-13 VITALS
HEART RATE: 107 BPM | SYSTOLIC BLOOD PRESSURE: 109 MMHG | HEIGHT: 65 IN | TEMPERATURE: 97.5 F | WEIGHT: 161.8 LBS | BODY MASS INDEX: 26.96 KG/M2 | DIASTOLIC BLOOD PRESSURE: 75 MMHG | OXYGEN SATURATION: 95 %

## 2022-01-13 DIAGNOSIS — K21.9 GASTROESOPHAGEAL REFLUX DISEASE, UNSPECIFIED WHETHER ESOPHAGITIS PRESENT: ICD-10-CM

## 2022-01-13 DIAGNOSIS — F41.8 MIXED ANXIETY DEPRESSIVE DISORDER: Primary | ICD-10-CM

## 2022-01-13 DIAGNOSIS — E03.9 HYPOTHYROIDISM, UNSPECIFIED TYPE: ICD-10-CM

## 2022-01-13 LAB
ALBUMIN SERPL-MCNC: 4.5 G/DL (ref 3.5–5.2)
ALBUMIN/GLOB SERPL: 1.4 G/DL
ALP SERPL-CCNC: 114 U/L (ref 39–117)
ALT SERPL W P-5'-P-CCNC: 14 U/L (ref 1–33)
ANION GAP SERPL CALCULATED.3IONS-SCNC: 10.9 MMOL/L (ref 5–15)
AST SERPL-CCNC: 16 U/L (ref 1–32)
BASOPHILS # BLD AUTO: 0.01 10*3/MM3 (ref 0–0.2)
BASOPHILS NFR BLD AUTO: 0.2 % (ref 0–1.5)
BILIRUB SERPL-MCNC: 0.2 MG/DL (ref 0–1.2)
BUN SERPL-MCNC: 17 MG/DL (ref 6–20)
BUN/CREAT SERPL: 18.3 (ref 7–25)
CALCIUM SPEC-SCNC: 9.7 MG/DL (ref 8.6–10.5)
CHLORIDE SERPL-SCNC: 105 MMOL/L (ref 98–107)
CO2 SERPL-SCNC: 25.1 MMOL/L (ref 22–29)
CREAT SERPL-MCNC: 0.93 MG/DL (ref 0.57–1)
DEPRECATED RDW RBC AUTO: 39.6 FL (ref 37–54)
EOSINOPHIL # BLD AUTO: 0.05 10*3/MM3 (ref 0–0.4)
EOSINOPHIL NFR BLD AUTO: 1.1 % (ref 0.3–6.2)
ERYTHROCYTE [DISTWIDTH] IN BLOOD BY AUTOMATED COUNT: 12.2 % (ref 12.3–15.4)
GFR SERPL CREATININE-BSD FRML MDRD: 63 ML/MIN/1.73
GLOBULIN UR ELPH-MCNC: 3.3 GM/DL
GLUCOSE SERPL-MCNC: 107 MG/DL (ref 65–99)
HCT VFR BLD AUTO: 40.1 % (ref 34–46.6)
HGB BLD-MCNC: 13.3 G/DL (ref 12–15.9)
IMM GRANULOCYTES # BLD AUTO: 0.01 10*3/MM3 (ref 0–0.05)
IMM GRANULOCYTES NFR BLD AUTO: 0.2 % (ref 0–0.5)
LYMPHOCYTES # BLD AUTO: 1.12 10*3/MM3 (ref 0.7–3.1)
LYMPHOCYTES NFR BLD AUTO: 24.3 % (ref 19.6–45.3)
MCH RBC QN AUTO: 29.6 PG (ref 26.6–33)
MCHC RBC AUTO-ENTMCNC: 33.2 G/DL (ref 31.5–35.7)
MCV RBC AUTO: 89.1 FL (ref 79–97)
MONOCYTES # BLD AUTO: 0.32 10*3/MM3 (ref 0.1–0.9)
MONOCYTES NFR BLD AUTO: 7 % (ref 5–12)
NEUTROPHILS NFR BLD AUTO: 3.09 10*3/MM3 (ref 1.7–7)
NEUTROPHILS NFR BLD AUTO: 67.2 % (ref 42.7–76)
NRBC BLD AUTO-RTO: 0 /100 WBC (ref 0–0.2)
PLATELET # BLD AUTO: 143 10*3/MM3 (ref 140–450)
PMV BLD AUTO: 12.6 FL (ref 6–12)
POTASSIUM SERPL-SCNC: 3.7 MMOL/L (ref 3.5–5.2)
PROT SERPL-MCNC: 7.8 G/DL (ref 6–8.5)
RBC # BLD AUTO: 4.5 10*6/MM3 (ref 3.77–5.28)
SODIUM SERPL-SCNC: 141 MMOL/L (ref 136–145)
T4 FREE SERPL-MCNC: 1.12 NG/DL (ref 0.93–1.7)
TSH SERPL DL<=0.05 MIU/L-ACNC: 0.61 UIU/ML (ref 0.27–4.2)
WBC NRBC COR # BLD: 4.6 10*3/MM3 (ref 3.4–10.8)

## 2022-01-13 PROCEDURE — 99214 OFFICE O/P EST MOD 30 MIN: CPT | Performed by: FAMILY MEDICINE

## 2022-01-13 PROCEDURE — 36415 COLL VENOUS BLD VENIPUNCTURE: CPT | Performed by: FAMILY MEDICINE

## 2022-01-13 PROCEDURE — 85025 COMPLETE CBC W/AUTO DIFF WBC: CPT | Performed by: FAMILY MEDICINE

## 2022-01-13 PROCEDURE — 80053 COMPREHEN METABOLIC PANEL: CPT | Performed by: FAMILY MEDICINE

## 2022-01-13 PROCEDURE — 84439 ASSAY OF FREE THYROXINE: CPT | Performed by: FAMILY MEDICINE

## 2022-01-13 PROCEDURE — 84443 ASSAY THYROID STIM HORMONE: CPT | Performed by: FAMILY MEDICINE

## 2022-01-13 RX ORDER — LORAZEPAM 0.5 MG/1
0.5 TABLET ORAL 2 TIMES DAILY
Qty: 60 TABLET | Refills: 0 | Status: SHIPPED | OUTPATIENT
Start: 2022-01-13 | End: 2022-02-09

## 2022-01-13 RX ORDER — QUETIAPINE FUMARATE 100 MG/1
100 TABLET, FILM COATED ORAL 3 TIMES DAILY
Qty: 90 TABLET | Refills: 0 | Status: SHIPPED | OUTPATIENT
Start: 2022-01-13 | End: 2022-03-07

## 2022-01-13 RX ORDER — VENLAFAXINE HYDROCHLORIDE 75 MG/1
75 CAPSULE, EXTENDED RELEASE ORAL DAILY
Qty: 90 CAPSULE | Refills: 3 | Status: SHIPPED | OUTPATIENT
Start: 2022-01-13 | End: 2023-02-06

## 2022-01-13 NOTE — PROGRESS NOTES
Subjective   Erna Shine is a 56 y.o. female.     56-year-old female patient present with caregiver for follow-up on anxiety.  Patient is mostly nonverbal and poor historian per caregiver patient  is becoming more irritable, anger out and anxious.  She denies any recent changes or stressors in the life.    Anxiety  Presents for follow-up visit. Symptoms include depressed mood, irritability, nervous/anxious behavior and restlessness. Patient reports no chest pain, confusion, decreased concentration, excessive worry, insomnia, nausea, panic, shortness of breath or suicidal ideas. Symptoms occur most days. The severity of symptoms is moderate. The quality of sleep is fair.       Hypothyroidism  The patient also  presents with  f/u on  hypothyroidism.  She complains of anxiety but denies chest pain, palpitations, shortness of breath, trouble swallowing, dry skin and hair loss.  Per caregiver she is taking medication as directed.       The following portions of the patient's history were reviewed and updated as appropriate: past medical history, past social history, past surgical history and problem list.    Review of Systems   Constitutional: Positive for activity change and irritability. Negative for appetite change and fever.   HENT: Negative for sinus pressure and trouble swallowing.    Respiratory: Negative for shortness of breath.    Cardiovascular: Negative for chest pain.   Gastrointestinal: Negative for abdominal pain, nausea, vomiting and indigestion.   Endocrine: Negative for cold intolerance.   Genitourinary: Negative for dysuria.   Neurological: Negative for headache and confusion.   Psychiatric/Behavioral: Positive for depressed mood. Negative for decreased concentration, sleep disturbance and suicidal ideas. The patient is nervous/anxious. The patient does not have insomnia.        Objective   Physical Exam  Vitals reviewed.   Constitutional:       General: She is not in acute distress.      Appearance: She is well-developed.   Neck:      Thyroid: No thyromegaly.   Cardiovascular:      Rate and Rhythm: Normal rate.      Heart sounds: Normal heart sounds.   Pulmonary:      Effort: Pulmonary effort is normal.      Breath sounds: Normal breath sounds. No wheezing.   Abdominal:      General: Bowel sounds are normal.      Palpations: Abdomen is soft.      Tenderness: There is no abdominal tenderness.   Musculoskeletal:         General: Normal range of motion.      Cervical back: Normal range of motion and neck supple.   Neurological:      Mental Status: She is alert and oriented to person, place, and time.   Psychiatric:         Mood and Affect: Mood is depressed.         Speech: She is noncommunicative.         Behavior: Behavior is not agitated or aggressive. Behavior is cooperative.       Vitals:    01/13/22 1133   BP: 109/75   Pulse: 107   Temp: 97.5 °F (36.4 °C)   SpO2: 95%     Current Outpatient Medications on File Prior to Visit   Medication Sig Dispense Refill   • Allergy Relief 10 MG tablet Take 1 tablet by mouth Daily.     • donepezil (ARICEPT) 10 MG tablet Take 10 mg by mouth Every Evening.     • levothyroxine (SYNTHROID, LEVOTHROID) 100 MCG tablet Take 100 mcg by mouth Daily.     • Loratadine 10 MG capsule Take 1 tablet by mouth Daily.     • Namzaric 14-10 MG capsule sustained-release 24 hr TAKE ONE CAPSULE BY MOUTH EVERY DAY 30 capsule 0   • omeprazole (priLOSEC) 40 MG capsule Take 40 mg by mouth Daily.       No current facility-administered medications on file prior to visit.           Assessment/Plan   Problems Addressed this Visit        Endocrine and Metabolic    Hypothyroidism     Continue current dose of levothyroxine we will check TSH.         Relevant Orders    Comprehensive metabolic panel (Completed)    TSH (Completed)    T4, free (Completed)    CBC w AUTO Differential (Completed)       Gastrointestinal Abdominal     GERD (gastroesophageal reflux disease)     Symptoms are improving  continue current treatment regimen.  Discussed dietary changes and lifestyle modifications.           Relevant Orders    Comprehensive metabolic panel (Completed)    CBC w AUTO Differential (Completed)       Mental Health    Mixed anxiety depressive disorder - Primary     Patient's depression is recurrent and is moderate without psychosis. Their depression is currently active and the condition is worsening.   Medication reviewed with caregiver and looks like patient is not taking anxiety medicine as prescribed.  Rx Seroquel, Effexor and Ativan.  Print  handout given with medication direction and dosage.  This will be reassessed in 4 weeks. F/U as described:patient was prescribed an antidepressant medicine and patient referred to Mental Health Specialist.         Relevant Medications    QUEtiapine (SEROquel) 100 MG tablet    venlafaxine XR (EFFEXOR-XR) 75 MG 24 hr capsule    LORazepam (ATIVAN) 0.5 MG tablet    Other Relevant Orders    TSH (Completed)    T4, free (Completed)    CBC w AUTO Differential (Completed)    Ambulatory Referral to Psychiatry      Diagnoses       Codes Comments    Mixed anxiety depressive disorder    -  Primary ICD-10-CM: F41.8  ICD-9-CM: 300.4     Hypothyroidism, unspecified type     ICD-10-CM: E03.9  ICD-9-CM: 244.9     Gastroesophageal reflux disease, unspecified whether esophagitis present     ICD-10-CM: K21.9  ICD-9-CM: 530.81

## 2022-01-17 NOTE — ASSESSMENT & PLAN NOTE
Symptoms are improving continue current treatment regimen.  Discussed dietary changes and lifestyle modifications.

## 2022-01-17 NOTE — ASSESSMENT & PLAN NOTE
Patient's depression is recurrent and is moderate without psychosis. Their depression is currently active and the condition is worsening.   Medication reviewed with caregiver and looks like patient is not taking anxiety medicine as prescribed.  Rx Seroquel, Effexor and Ativan.  Print  handout given with medication direction and dosage.  This will be reassessed in 4 weeks. F/U as described:patient was prescribed an antidepressant medicine and patient referred to Mental Health Specialist.

## 2022-02-09 DIAGNOSIS — F41.8 MIXED ANXIETY DEPRESSIVE DISORDER: ICD-10-CM

## 2022-02-09 RX ORDER — LORAZEPAM 0.5 MG/1
TABLET ORAL
Qty: 60 TABLET | Refills: 0 | Status: SHIPPED | OUTPATIENT
Start: 2022-02-09 | End: 2022-03-07

## 2022-02-09 RX ORDER — MEMANTINE HYDROCHLORIDE AND DONEPEZIL HYDROCHLORIDE 14; 10 MG/1; MG/1
CAPSULE ORAL
Qty: 30 CAPSULE | Refills: 0 | Status: SHIPPED | OUTPATIENT
Start: 2022-02-09 | End: 2022-03-07

## 2022-03-03 RX ORDER — DONEPEZIL HYDROCHLORIDE 10 MG/1
TABLET, FILM COATED ORAL
Qty: 30 TABLET | Refills: 3 | Status: SHIPPED | OUTPATIENT
Start: 2022-03-03 | End: 2022-05-10 | Stop reason: SDUPTHER

## 2022-03-07 DIAGNOSIS — F41.8 MIXED ANXIETY DEPRESSIVE DISORDER: ICD-10-CM

## 2022-03-07 RX ORDER — MEMANTINE HYDROCHLORIDE AND DONEPEZIL HYDROCHLORIDE 14; 10 MG/1; MG/1
CAPSULE ORAL
Qty: 30 CAPSULE | Refills: 0 | Status: SHIPPED | OUTPATIENT
Start: 2022-03-07 | End: 2022-04-06

## 2022-03-07 RX ORDER — LORAZEPAM 0.5 MG/1
TABLET ORAL
Qty: 60 TABLET | Refills: 0 | Status: SHIPPED | OUTPATIENT
Start: 2022-03-07 | End: 2022-04-06

## 2022-03-07 RX ORDER — QUETIAPINE FUMARATE 100 MG/1
TABLET, FILM COATED ORAL
Qty: 90 TABLET | Refills: 0 | Status: SHIPPED | OUTPATIENT
Start: 2022-03-07 | End: 2022-05-05

## 2022-04-06 DIAGNOSIS — F41.8 MIXED ANXIETY DEPRESSIVE DISORDER: ICD-10-CM

## 2022-04-06 RX ORDER — MEMANTINE HYDROCHLORIDE AND DONEPEZIL HYDROCHLORIDE 14; 10 MG/1; MG/1
CAPSULE ORAL
Qty: 30 CAPSULE | Refills: 4 | Status: SHIPPED | OUTPATIENT
Start: 2022-04-06 | End: 2022-08-02 | Stop reason: SDUPTHER

## 2022-04-06 RX ORDER — LORAZEPAM 0.5 MG/1
TABLET ORAL
Qty: 60 TABLET | Refills: 0 | Status: SHIPPED | OUTPATIENT
Start: 2022-04-06 | End: 2022-05-05

## 2022-05-05 DIAGNOSIS — F41.8 MIXED ANXIETY DEPRESSIVE DISORDER: ICD-10-CM

## 2022-05-05 RX ORDER — QUETIAPINE FUMARATE 100 MG/1
TABLET, FILM COATED ORAL
Qty: 90 TABLET | Refills: 0 | Status: SHIPPED | OUTPATIENT
Start: 2022-05-05 | End: 2022-08-02 | Stop reason: SDUPTHER

## 2022-05-05 RX ORDER — LORAZEPAM 0.5 MG/1
TABLET ORAL
Qty: 60 TABLET | Refills: 0 | Status: SHIPPED | OUTPATIENT
Start: 2022-05-05 | End: 2022-06-05

## 2022-05-09 ENCOUNTER — TELEPHONE (OUTPATIENT)
Dept: FAMILY MEDICINE CLINIC | Facility: CLINIC | Age: 57
End: 2022-05-09

## 2022-05-09 NOTE — TELEPHONE ENCOUNTER
Caller: [facility] - [place], [place] -[id number] - [place] - [address] [facility] [address] [address] [phone] [facility] [phone] [place]    Relationship: Pharmacy    Best call back number: [phone]    What is the best time to reach you: ANYTIME BEFORE 5PM     Who are you requesting to speak with (clinical staff, provider,  specific staff member): CLINICAL     Do you know the name of the person who called: ROHINI     What was the call regarding: ROHINI CALLED AND WANTED TO MAKE THE PRESCRIPTION FOR NAMZARIC 14-10 MG WAS CORRECT SINCE THE MEDICATION HAS DONEPEZIL IN IT AND THE PATIENT IS ALREADY ON DONEPEZIL 10 MG. PLEASE CALL AND ADVISE       Do you require a callback: YES

## 2022-06-04 DIAGNOSIS — F41.8 MIXED ANXIETY DEPRESSIVE DISORDER: ICD-10-CM

## 2022-06-05 RX ORDER — LORAZEPAM 0.5 MG/1
TABLET ORAL
Qty: 60 TABLET | Refills: 0 | Status: SHIPPED | OUTPATIENT
Start: 2022-06-05 | End: 2022-07-14

## 2022-07-14 DIAGNOSIS — F41.8 MIXED ANXIETY DEPRESSIVE DISORDER: ICD-10-CM

## 2022-07-14 RX ORDER — LORAZEPAM 0.5 MG/1
TABLET ORAL
Qty: 60 TABLET | Refills: 0 | Status: SHIPPED | OUTPATIENT
Start: 2022-07-14 | End: 2022-08-11

## 2022-07-24 RX ORDER — DONEPEZIL HYDROCHLORIDE 10 MG/1
TABLET, FILM COATED ORAL
Qty: 30 TABLET | Refills: 3 | Status: SHIPPED | OUTPATIENT
Start: 2022-07-24 | End: 2022-08-02

## 2022-08-02 ENCOUNTER — OFFICE VISIT (OUTPATIENT)
Dept: FAMILY MEDICINE CLINIC | Facility: CLINIC | Age: 57
End: 2022-08-02

## 2022-08-02 VITALS
WEIGHT: 154 LBS | DIASTOLIC BLOOD PRESSURE: 80 MMHG | TEMPERATURE: 98.2 F | OXYGEN SATURATION: 97 % | RESPIRATION RATE: 16 BRPM | HEART RATE: 69 BPM | BODY MASS INDEX: 25.66 KG/M2 | HEIGHT: 65 IN | SYSTOLIC BLOOD PRESSURE: 129 MMHG

## 2022-08-02 DIAGNOSIS — Z00.00 MEDICARE ANNUAL WELLNESS VISIT, SUBSEQUENT: Primary | ICD-10-CM

## 2022-08-02 DIAGNOSIS — Z12.31 SCREENING MAMMOGRAM FOR BREAST CANCER: ICD-10-CM

## 2022-08-02 DIAGNOSIS — F41.8 MIXED ANXIETY DEPRESSIVE DISORDER: ICD-10-CM

## 2022-08-02 PROCEDURE — 1170F FXNL STATUS ASSESSED: CPT | Performed by: FAMILY MEDICINE

## 2022-08-02 PROCEDURE — G0439 PPPS, SUBSEQ VISIT: HCPCS | Performed by: FAMILY MEDICINE

## 2022-08-02 PROCEDURE — 1159F MED LIST DOCD IN RCRD: CPT | Performed by: FAMILY MEDICINE

## 2022-08-02 RX ORDER — MEMANTINE HYDROCHLORIDE AND DONEPEZIL HYDROCHLORIDE 14; 10 MG/1; MG/1
1 CAPSULE ORAL DAILY
Qty: 90 CAPSULE | Refills: 3 | Status: SHIPPED | OUTPATIENT
Start: 2022-08-02

## 2022-08-02 RX ORDER — OMEPRAZOLE 40 MG/1
40 CAPSULE, DELAYED RELEASE ORAL DAILY
Qty: 90 CAPSULE | Refills: 3 | Status: SHIPPED | OUTPATIENT
Start: 2022-08-02

## 2022-08-02 RX ORDER — QUETIAPINE FUMARATE 100 MG/1
100 TABLET, FILM COATED ORAL 3 TIMES DAILY
Qty: 90 TABLET | Refills: 3 | Status: SHIPPED | OUTPATIENT
Start: 2022-08-02 | End: 2022-11-25

## 2022-08-02 RX ORDER — ESCITALOPRAM OXALATE 10 MG/1
10 TABLET ORAL DAILY
COMMUNITY
Start: 2022-05-05 | End: 2022-08-02

## 2022-08-02 RX ORDER — LORATADINE 10 MG/1
10 CAPSULE, LIQUID FILLED ORAL DAILY
Qty: 90 EACH | Refills: 3 | Status: SHIPPED | OUTPATIENT
Start: 2022-08-02

## 2022-08-02 NOTE — PROGRESS NOTES
The ABCs of the Annual Wellness Visit  Subsequent Medicare Wellness Visit    Chief Complaint   Patient presents with   • Medicare Wellness-subsequent   • Anxiety   • Heartburn      Subjective    History of Present Illness:  Erna Shine is a 57 y.o. female who presents for a Subsequent Medicare Wellness Visit.    The following portions of the patient's history were reviewed and   updated as appropriate: past medical history, past social history, past surgical history and problem list.    Compared to one year ago, the patient feels her physical   health is the same.    Compared to one year ago, the patient feels her mental   health is the same.    Recent Hospitalizations:  She was not admitted to the hospital during the last year.       Current Medical Providers:  Patient Care Team:  Marilyn Pacheco MD as PCP - General (Family Medicine)    Outpatient Medications Prior to Visit   Medication Sig Dispense Refill   • venlafaxine XR (EFFEXOR-XR) 75 MG 24 hr capsule Take 1 capsule by mouth Daily. with food 90 capsule 3   • Allergy Relief 10 MG tablet Take 1 tablet by mouth Daily.     • donepezil (ARICEPT) 10 MG tablet TAKE ONE TABLET BY MOUTH EVERY MORNING 30 tablet 3   • escitalopram (LEXAPRO) 10 MG tablet Take 10 mg by mouth Daily.     • levothyroxine (SYNTHROID, LEVOTHROID) 100 MCG tablet Take 100 mcg by mouth Daily.     • Loratadine 10 MG capsule Take 1 tablet by mouth Daily.     • LORazepam (ATIVAN) 0.5 MG tablet TAKE ONE TABLET BY MOUTH TWICE DAILY 60 tablet 0   • Namzaric 14-10 MG capsule sustained-release 24 hr TAKE ONE CAPSULE BY MOUTH EVERY DAY 30 capsule 4   • omeprazole (priLOSEC) 40 MG capsule Take 40 mg by mouth Daily.     • QUEtiapine (SEROquel) 100 MG tablet TAKE ONE TABLET BY MOUTH THREE TIMES DAILY 90 tablet 0     No facility-administered medications prior to visit.       No opioid medication identified on active medication list. I have reviewed chart for other potential  high risk medication/s and  "harmful drug interactions in the elderly.              Patient Active Problem List   Diagnosis   • SBO (small bowel obstruction) (HCC)   • Hypothyroidism   • GERD (gastroesophageal reflux disease)   • Mixed anxiety depressive disorder   • Vision changes   • Medicare annual wellness visit, subsequent   • Screening mammogram for breast cancer       Review of Systems   Constitutional: Negative for activity change, appetite change, fatigue and fever.   HENT: Negative for trouble swallowing.    Respiratory: Negative for cough, shortness of breath and wheezing.    Cardiovascular: Negative for chest pain.   Gastrointestinal: Negative for abdominal pain, constipation, nausea and vomiting.        Heartburn   Endocrine: Negative for cold intolerance and polyphagia.   Genitourinary: Negative for dysuria.   Neurological: Negative for dizziness.   Psychiatric/Behavioral: Positive for decreased concentration. Negative for sleep disturbance and suicidal ideas. The patient is nervous/anxious.         Objective    Vitals:    08/02/22 1535   BP: 129/80   Pulse: 69   Resp: 16   Temp: 98.2 °F (36.8 °C)   SpO2: 97%   Weight: 69.9 kg (154 lb)   Height: 165.1 cm (65\")     Estimated body mass index is 25.63 kg/m² as calculated from the following:    Height as of this encounter: 165.1 cm (65\").    Weight as of this encounter: 69.9 kg (154 lb).    BMI is >= 25 and <30. (Overweight) The following options were offered after discussion;: exercise counseling/recommendations and nutrition counseling/recommendations      Does the patient have evidence of cognitive impairment? Yes    Physical Exam  Vitals reviewed.   Constitutional:       General: She is not in acute distress.  Pulmonary:      Effort: Pulmonary effort is normal.      Breath sounds: Normal breath sounds.   Abdominal:      Tenderness: There is no abdominal tenderness.   Neurological:      Mental Status: She is alert.   Psychiatric:         Mood and Affect: Mood normal.           "       HEALTH RISK ASSESSMENT    Smoking Status:  Social History     Tobacco Use   Smoking Status Never Smoker   Smokeless Tobacco Never Used     Alcohol Consumption:  Social History     Substance and Sexual Activity   Alcohol Use No       Depression Screening:  PHQ-2/PHQ-9 Depression Screening 8/2/2022   Little Interest or Pleasure in Doing Things 0-->not at all   Feeling Down, Depressed or Hopeless 1-->several days   PHQ-9: Brief Depression Severity Measure Score 1       Health Habits and Functional and Cognitive Screening:  Functional & Cognitive Status 8/2/2022   Do you have difficulty preparing food and eating? Yes   Do you have difficulty bathing yourself, getting dressed or grooming yourself? Yes   Do you have difficulty moving around from place to place? Yes   Do you need help using the phone?  Yes   Are you deaf or do you have serious difficulty hearing?  No   Do you need help with transportation? Yes   Do you need help shopping? Yes   Do you need help preparing meals?  Yes   Do you need help with housework?  Yes   Do you need help with laundry? Yes   Do you need help taking your medications? Yes   Have you felt unusual stress, anger or loneliness in the last month? Yes   Who do you live with? Other   Do you have difficulty concentrating, remembering or making decisions? Yes       Age-appropriate Screening Schedule:  Refer to the list below for future screening recommendations based on patient's age, sex and/or medical conditions. Orders for these recommended tests are listed in the plan section. The patient has been provided with a written plan.    Health Maintenance   Topic Date Due   • MAMMOGRAM  05/30/2015   • ZOSTER VACCINE (1 of 2) Never done   • PAP SMEAR  Never done   • INFLUENZA VACCINE  10/01/2022   • TDAP/TD VACCINES (2 - Td or Tdap) 09/06/2024              Assessment & Plan   CMS Preventative Services Quick Reference  Risk Factors Identified During Encounter  Depression/Dysphoria  Immunizations  Discussed/Encouraged (specific Immunizations; Shingrix  The above risks/problems have been discussed with the patient.  Follow up actions/plans if indicated are seen below in the Assessment/Plan Section.  Pertinent information has been shared with the patient in the After Visit Summary.    Diagnoses and all orders for this visit:    1. Medicare annual wellness visit, subsequent (Primary)  Assessment & Plan:  Discussed healthy diet and  importance of regular exercise and recommend starting or continuing a regular exercise program for good health.  Stressed importance of moderation in sodium/caffeine intake, saturated fat and cholesterol, caloric balance, sufficient intake of fresh fruits, vegetables, fiber, calcium and iron.        2. Mixed anxiety depressive disorder  Assessment & Plan:  Patient's depression is recurrent and is mild without psychosis. Their depression is currently active and the condition is improving with treatment. This will be reassessed in 3 months. F/U as described:patient will continue current medication therapy.      3. Screening mammogram for breast cancer  -     Mammo Screening Digital Tomosynthesis Bilateral With CAD    Other orders  -     omeprazole (priLOSEC) 40 MG capsule; Take 1 capsule by mouth Daily.  Dispense: 90 capsule; Refill: 3      Follow Up:   Return in about 3 months (around 11/2/2022) for Recheck.     An After Visit Summary and PPPS were made available to the patient.    {

## 2022-08-11 DIAGNOSIS — F41.8 MIXED ANXIETY DEPRESSIVE DISORDER: ICD-10-CM

## 2022-08-11 PROBLEM — Z12.31 SCREENING MAMMOGRAM FOR BREAST CANCER: Status: ACTIVE | Noted: 2022-08-11

## 2022-08-11 RX ORDER — LORAZEPAM 0.5 MG/1
TABLET ORAL
Qty: 60 TABLET | Refills: 0 | Status: SHIPPED | OUTPATIENT
Start: 2022-08-11 | End: 2022-09-16

## 2022-09-16 DIAGNOSIS — F41.8 MIXED ANXIETY DEPRESSIVE DISORDER: ICD-10-CM

## 2022-09-16 RX ORDER — LORAZEPAM 0.5 MG/1
TABLET ORAL
Qty: 60 TABLET | Refills: 0 | Status: SHIPPED | OUTPATIENT
Start: 2022-09-16 | End: 2022-12-09

## 2022-11-25 RX ORDER — QUETIAPINE FUMARATE 100 MG/1
TABLET, FILM COATED ORAL
Qty: 90 TABLET | Refills: 3 | Status: SHIPPED | OUTPATIENT
Start: 2022-11-25 | End: 2023-03-30

## 2022-12-09 DIAGNOSIS — F41.8 MIXED ANXIETY DEPRESSIVE DISORDER: ICD-10-CM

## 2022-12-09 RX ORDER — LORAZEPAM 0.5 MG/1
TABLET ORAL
Qty: 60 TABLET | Refills: 0 | Status: SHIPPED | OUTPATIENT
Start: 2022-12-09 | End: 2023-01-31 | Stop reason: SDUPTHER

## 2022-12-22 ENCOUNTER — OFFICE VISIT (OUTPATIENT)
Dept: FAMILY MEDICINE CLINIC | Facility: CLINIC | Age: 57
End: 2022-12-22

## 2022-12-22 VITALS
HEART RATE: 82 BPM | TEMPERATURE: 97.5 F | BODY MASS INDEX: 26.02 KG/M2 | DIASTOLIC BLOOD PRESSURE: 76 MMHG | OXYGEN SATURATION: 98 % | RESPIRATION RATE: 16 BRPM | WEIGHT: 156.2 LBS | HEIGHT: 65 IN | SYSTOLIC BLOOD PRESSURE: 111 MMHG

## 2022-12-22 DIAGNOSIS — R35.0 URINARY FREQUENCY: Primary | ICD-10-CM

## 2022-12-22 DIAGNOSIS — E03.9 HYPOTHYROIDISM, UNSPECIFIED TYPE: ICD-10-CM

## 2022-12-22 DIAGNOSIS — F41.8 MIXED ANXIETY DEPRESSIVE DISORDER: ICD-10-CM

## 2022-12-22 DIAGNOSIS — K21.9 GASTROESOPHAGEAL REFLUX DISEASE, UNSPECIFIED WHETHER ESOPHAGITIS PRESENT: ICD-10-CM

## 2022-12-22 LAB
BILIRUB BLD-MCNC: NEGATIVE MG/DL
CLARITY, POC: CLEAR
COLOR UR: YELLOW
EXPIRATION DATE: ABNORMAL
GLUCOSE UR STRIP-MCNC: NEGATIVE MG/DL
KETONES UR QL: NEGATIVE
LEUKOCYTE EST, POC: ABNORMAL
Lab: ABNORMAL
NITRITE UR-MCNC: NEGATIVE MG/ML
PH UR: 6 [PH] (ref 5–8)
PROT UR STRIP-MCNC: NEGATIVE MG/DL
RBC # UR STRIP: ABNORMAL /UL
SP GR UR: 1.01 (ref 1–1.03)
UROBILINOGEN UR QL: NORMAL

## 2022-12-22 PROCEDURE — 87086 URINE CULTURE/COLONY COUNT: CPT | Performed by: FAMILY MEDICINE

## 2022-12-22 PROCEDURE — 99214 OFFICE O/P EST MOD 30 MIN: CPT | Performed by: FAMILY MEDICINE

## 2022-12-22 PROCEDURE — 81003 URINALYSIS AUTO W/O SCOPE: CPT | Performed by: FAMILY MEDICINE

## 2022-12-22 RX ORDER — NITROFURANTOIN 25; 75 MG/1; MG/1
100 CAPSULE ORAL 2 TIMES DAILY
Qty: 14 CAPSULE | Refills: 0 | Status: SHIPPED | OUTPATIENT
Start: 2022-12-22 | End: 2022-12-29

## 2022-12-22 NOTE — PROGRESS NOTES
Subjective   Erna Shine is a 57 y.o. female.     History of Present Illness     57-year-old female patient with mental retardation present with caregiver with complaint of urinary frequency, urinary incontinence  and becoming more irritable, anger out and anxious.  She denied dysuria, flank pain, abdominal pain, blood in urine and fever.    Anxiety  While patient present for above we also performed follow-up visit on anxiety, GERD and hypothyroidism. Symptoms include depressed mood, irritability, nervous/anxious behavior and restlessness. Patient reports no chest pain, confusion,  heartburn, abdominal pain, nausea, insomnia,  shortness of breath or suicidal ideas.   Hypothyroidism  The patient also  presents with  f/u on  hypothyroidism.  She complains of anxiety and fatigue but denies chest pain, palpitations, shortness of breath, trouble swallowing and hair loss.  Per caregiver she is taking medication as directed.    The following portions of the patient's history were reviewed and updated as appropriate: past medical history, past social history, past surgical history and problem list.    Review of Systems   Constitutional: Positive for fatigue. Negative for fever.   Respiratory: Negative for shortness of breath.    Gastrointestinal: Negative for abdominal pain, nausea, vomiting and indigestion.   Endocrine: Negative for cold intolerance and heat intolerance.   Genitourinary: Positive for frequency. Negative for dysuria, flank pain and hematuria.   Psychiatric/Behavioral: Positive for stress. Negative for agitation, behavioral problems and sleep disturbance. The patient is nervous/anxious.         Irritable easily       Objective   Physical Exam  Vitals reviewed.   Constitutional:       General: She is not in acute distress.     Appearance: She is well-developed.   Neck:      Thyroid: No thyromegaly.   Cardiovascular:      Heart sounds: Normal heart sounds.   Pulmonary:      Effort: Pulmonary effort is  normal.      Breath sounds: Normal breath sounds.   Abdominal:      Palpations: Abdomen is soft.      Tenderness: There is no abdominal tenderness. There is no right CVA tenderness or left CVA tenderness.   Musculoskeletal:         General: Normal range of motion.      Cervical back: Neck supple.   Neurological:      Mental Status: She is alert. Mental status is at baseline.   Psychiatric:         Mood and Affect: Mood normal.       Vitals:    12/22/22 1241   BP: 111/76   Pulse: 82   Resp: 16   Temp: 97.5 °F (36.4 °C)   SpO2: 98%     Current Outpatient Medications on File Prior to Visit   Medication Sig Dispense Refill   • Loratadine 10 MG capsule Take 1 capsule by mouth Daily. 90 each 3   • LORazepam (ATIVAN) 0.5 MG tablet TAKE ONE TABLET BY MOUTH TWICE DAILY 60 tablet 0   • Memantine HCl-Donepezil HCl (Namzaric) 14-10 MG capsule sustained-release 24 hr Take 1 capsule by mouth Daily. 90 capsule 3   • omeprazole (priLOSEC) 40 MG capsule Take 1 capsule by mouth Daily. 90 capsule 3   • QUEtiapine (SEROquel) 100 MG tablet TAKE ONE TABLET BY MOUTH THREE TIMES DAILY 90 tablet 3   • venlafaxine XR (EFFEXOR-XR) 75 MG 24 hr capsule Take 1 capsule by mouth Daily. with food 90 capsule 3     No current facility-administered medications on file prior to visit.           Assessment & Plan   Problems Addressed this Visit        Endocrine and Metabolic    Hypothyroidism     We will check CBC, TSH and free T4.         Relevant Orders    Basic metabolic panel    T4, free    TSH    CBC w AUTO Differential       Gastrointestinal Abdominal     GERD (gastroesophageal reflux disease)     Symptoms are improving continue current treatment regimen.  Discussed dietary changes and lifestyle modifications.            Genitourinary and Reproductive     Urinary frequency - Primary    Relevant Orders    Urine Culture - Urine, Urine, Clean Catch (Completed)    POCT urinalysis dipstick, automated (Completed)       Mental Health    Mixed anxiety  depressive disorder     Anxiety is worsening discussed psych referral and  Medications.  Continue current medication. The patient verified not suicidal at this time, she will call our office if there is  any worsening of Sx or thoughts of harm arise.             Relevant Orders    TSH    CBC w AUTO Differential   Diagnoses       Codes Comments    Urinary frequency    -  Primary ICD-10-CM: R35.0  ICD-9-CM: 788.41     Mixed anxiety depressive disorder     ICD-10-CM: F41.8  ICD-9-CM: 300.4     Hypothyroidism, unspecified type     ICD-10-CM: E03.9  ICD-9-CM: 244.9     Gastroesophageal reflux disease, unspecified whether esophagitis present     ICD-10-CM: K21.9  ICD-9-CM: 530.81

## 2022-12-24 LAB — BACTERIA SPEC AEROBE CULT: NORMAL

## 2022-12-29 RX ORDER — DONEPEZIL HYDROCHLORIDE 10 MG/1
TABLET, FILM COATED ORAL
Qty: 30 TABLET | Refills: 4 | OUTPATIENT
Start: 2022-12-29

## 2022-12-29 NOTE — ASSESSMENT & PLAN NOTE
Anxiety is worsening discussed psych referral and  Medications.  Continue current medication. The patient verified not suicidal at this time, she will call our office if there is  any worsening of Sx or thoughts of harm arise.

## 2023-01-31 DIAGNOSIS — F41.8 MIXED ANXIETY DEPRESSIVE DISORDER: ICD-10-CM

## 2023-01-31 RX ORDER — LORAZEPAM 0.5 MG/1
0.5 TABLET ORAL 2 TIMES DAILY
Qty: 60 TABLET | Refills: 0 | Status: SHIPPED | OUTPATIENT
Start: 2023-01-31 | End: 2023-03-06

## 2023-02-06 RX ORDER — VENLAFAXINE HYDROCHLORIDE 75 MG/1
CAPSULE, EXTENDED RELEASE ORAL
Qty: 90 CAPSULE | Refills: 3 | Status: SHIPPED | OUTPATIENT
Start: 2023-02-06

## 2023-03-06 DIAGNOSIS — F41.8 MIXED ANXIETY DEPRESSIVE DISORDER: ICD-10-CM

## 2023-03-06 RX ORDER — LORAZEPAM 0.5 MG/1
TABLET ORAL
Qty: 60 TABLET | Refills: 0 | Status: SHIPPED | OUTPATIENT
Start: 2023-03-06

## 2023-03-28 ENCOUNTER — TELEPHONE (OUTPATIENT)
Dept: FAMILY MEDICINE CLINIC | Facility: CLINIC | Age: 58
End: 2023-03-28

## 2023-03-28 ENCOUNTER — LAB (OUTPATIENT)
Dept: FAMILY MEDICINE CLINIC | Facility: CLINIC | Age: 58
End: 2023-03-28
Payer: MEDICARE

## 2023-03-28 ENCOUNTER — OFFICE VISIT (OUTPATIENT)
Dept: FAMILY MEDICINE CLINIC | Facility: CLINIC | Age: 58
End: 2023-03-28
Payer: MEDICARE

## 2023-03-28 VITALS
OXYGEN SATURATION: 98 % | SYSTOLIC BLOOD PRESSURE: 130 MMHG | BODY MASS INDEX: 25.43 KG/M2 | WEIGHT: 152.6 LBS | RESPIRATION RATE: 16 BRPM | HEART RATE: 109 BPM | TEMPERATURE: 97.2 F | DIASTOLIC BLOOD PRESSURE: 80 MMHG | HEIGHT: 65 IN

## 2023-03-28 DIAGNOSIS — F41.8 MIXED ANXIETY DEPRESSIVE DISORDER: Primary | ICD-10-CM

## 2023-03-28 DIAGNOSIS — F69 MENTAL AND BEHAVIORAL PROBLEM: ICD-10-CM

## 2023-03-28 DIAGNOSIS — F48.9 MENTAL AND BEHAVIORAL PROBLEM: ICD-10-CM

## 2023-03-28 DIAGNOSIS — F84.0 AUTISM DISORDER: ICD-10-CM

## 2023-03-28 DIAGNOSIS — E03.9 HYPOTHYROIDISM, UNSPECIFIED TYPE: ICD-10-CM

## 2023-03-28 LAB
BASOPHILS # BLD AUTO: 0.01 10*3/MM3 (ref 0–0.2)
BASOPHILS NFR BLD AUTO: 0.2 % (ref 0–1.5)
DEPRECATED RDW RBC AUTO: 42.5 FL (ref 37–54)
EOSINOPHIL # BLD AUTO: 0.08 10*3/MM3 (ref 0–0.4)
EOSINOPHIL NFR BLD AUTO: 1.6 % (ref 0.3–6.2)
ERYTHROCYTE [DISTWIDTH] IN BLOOD BY AUTOMATED COUNT: 12.8 % (ref 12.3–15.4)
HCT VFR BLD AUTO: 38.7 % (ref 34–46.6)
HGB BLD-MCNC: 12.4 G/DL (ref 12–15.9)
IMM GRANULOCYTES # BLD AUTO: 0.01 10*3/MM3 (ref 0–0.05)
IMM GRANULOCYTES NFR BLD AUTO: 0.2 % (ref 0–0.5)
LYMPHOCYTES # BLD AUTO: 1.23 10*3/MM3 (ref 0.7–3.1)
LYMPHOCYTES NFR BLD AUTO: 25.3 % (ref 19.6–45.3)
MCH RBC QN AUTO: 29.5 PG (ref 26.6–33)
MCHC RBC AUTO-ENTMCNC: 32 G/DL (ref 31.5–35.7)
MCV RBC AUTO: 91.9 FL (ref 79–97)
MONOCYTES # BLD AUTO: 0.3 10*3/MM3 (ref 0.1–0.9)
MONOCYTES NFR BLD AUTO: 6.2 % (ref 5–12)
NEUTROPHILS NFR BLD AUTO: 3.24 10*3/MM3 (ref 1.7–7)
NEUTROPHILS NFR BLD AUTO: 66.5 % (ref 42.7–76)
NRBC BLD AUTO-RTO: 0 /100 WBC (ref 0–0.2)
PLATELET # BLD AUTO: 139 10*3/MM3 (ref 140–450)
PMV BLD AUTO: 12.6 FL (ref 6–12)
RBC # BLD AUTO: 4.21 10*6/MM3 (ref 3.77–5.28)
WBC NRBC COR # BLD: 4.87 10*3/MM3 (ref 3.4–10.8)

## 2023-03-28 PROCEDURE — 99214 OFFICE O/P EST MOD 30 MIN: CPT | Performed by: FAMILY MEDICINE

## 2023-03-28 PROCEDURE — 36415 COLL VENOUS BLD VENIPUNCTURE: CPT | Performed by: FAMILY MEDICINE

## 2023-03-28 PROCEDURE — 85025 COMPLETE CBC W/AUTO DIFF WBC: CPT | Performed by: FAMILY MEDICINE

## 2023-03-28 NOTE — PROGRESS NOTES
Subjective   Erna Shine is a 58 y.o. female.     Anxiety  Presents for follow-up visit. Symptoms include decreased concentration, depressed mood, irritability, malaise and nervous/anxious behavior. Patient reports no chest pain, insomnia, palpitations or shortness of breath. Symptoms occur most days. The severity of symptoms is moderate. The quality of sleep is fair.       Hypothyroidism  The patient also  presents with  f/u on  hypothyroidism.  She complains of anxiety but denies chest pain, palpitations, shortness of breath, trouble swallowing, dry skin and hair loss.  Per caregiver she is taking medication as directed.    The following portions of the patient's history were reviewed and updated as appropriate: past medical history, past social history, past surgical history and problem list.    Review of Systems   Constitutional: Positive for irritability. Negative for fatigue.   HENT: Negative for trouble swallowing.    Respiratory: Negative for shortness of breath.    Cardiovascular: Negative for chest pain and palpitations.   Endocrine: Negative for cold intolerance and heat intolerance.   Neurological: Negative for headache.   Psychiatric/Behavioral: Positive for decreased concentration and depressed mood. Negative for sleep disturbance. The patient is nervous/anxious. The patient does not have insomnia.        Objective   Physical Exam  Vitals reviewed.   Constitutional:       General: She is not in acute distress.     Appearance: She is well-developed.   Eyes:      Pupils: Pupils are equal, round, and reactive to light.   Neck:      Thyroid: No thyromegaly.   Pulmonary:      Breath sounds: Normal breath sounds.   Musculoskeletal:         General: Normal range of motion.      Cervical back: Normal range of motion and neck supple. No tenderness.   Neurological:      Mental Status: She is alert.   Psychiatric:         Mood and Affect: Mood is anxious.         Speech: She is noncommunicative.          Behavior: Behavior is cooperative.       Vitals:    03/28/23 1050   BP: 130/80   Pulse: 109   Resp: 16   Temp: 97.2 °F (36.2 °C)   SpO2: 98%     Current Outpatient Medications on File Prior to Visit   Medication Sig Dispense Refill   • Loratadine 10 MG capsule Take 1 capsule by mouth Daily. 90 each 3   • LORazepam (ATIVAN) 0.5 MG tablet TAKE ONE TABLET BY MOUTH TWICE DAILY 60 tablet 0   • Memantine HCl-Donepezil HCl (Namzaric) 14-10 MG capsule sustained-release 24 hr Take 1 capsule by mouth Daily. 90 capsule 3   • omeprazole (priLOSEC) 40 MG capsule Take 1 capsule by mouth Daily. 90 capsule 3   • QUEtiapine (SEROquel) 100 MG tablet TAKE ONE TABLET BY MOUTH THREE TIMES DAILY 90 tablet 3   • venlafaxine XR (EFFEXOR-XR) 75 MG 24 hr capsule TAKE ONE CAPSULE BY MOUTH DAILY WITH FOOD 90 capsule 3     No current facility-administered medications on file prior to visit.           Assessment & Plan   Problems Addressed this Visit        Endocrine and Metabolic    Hypothyroidism     Stable we will check TSH and free T4.  Continue current dose of levothyroxine.            Mental Health    Mixed anxiety depressive disorder - Primary     Patient's depression is recurrent and is moderate without psychosis. Their depression is currently active and the condition is worsening. This will be reassessed in 3 months. F/U as described:patient will continue current medication therapy and patient referred to Mental Health Specialist.         Relevant Orders    Ambulatory Referral to Psychiatry    Mental and behavioral problem    Relevant Orders    Ambulatory Referral to Psychiatry       Neuro    Autism disorder    Relevant Orders    Ambulatory Referral to Psychiatry   Diagnoses       Codes Comments    Mixed anxiety depressive disorder    -  Primary ICD-10-CM: F41.8  ICD-9-CM: 300.4     Hypothyroidism, unspecified type     ICD-10-CM: E03.9  ICD-9-CM: 244.9     Mental and behavioral problem     ICD-10-CM: F48.9, F69  ICD-9-CM: V40.9      Autism disorder     ICD-10-CM: F84.0  ICD-9-CM: 299.00

## 2023-03-28 NOTE — TELEPHONE ENCOUNTER
Left a voicemail on instructions to [employer].  Go on line fill out intake information for Erna.  Every Thursday [place], [place],or [place] office will see first come first served.   [place] [phone].  All intake paperwork must be filed out. Before any scheduling.

## 2023-03-29 ENCOUNTER — LAB (OUTPATIENT)
Dept: LAB | Facility: HOSPITAL | Age: 58
End: 2023-03-29
Payer: MEDICARE

## 2023-03-29 DIAGNOSIS — I51.9 MYXEDEMA HEART DISEASE: Primary | ICD-10-CM

## 2023-03-29 DIAGNOSIS — E03.9 HYPOTHYROIDISM, UNSPECIFIED TYPE: ICD-10-CM

## 2023-03-29 DIAGNOSIS — E03.9 MYXEDEMA HEART DISEASE: Primary | ICD-10-CM

## 2023-03-29 LAB
ANION GAP SERPL CALCULATED.3IONS-SCNC: 10 MMOL/L (ref 5–15)
BUN SERPL-MCNC: 11 MG/DL (ref 6–20)
BUN/CREAT SERPL: 14.5 (ref 7–25)
CALCIUM SPEC-SCNC: 8.8 MG/DL (ref 8.6–10.5)
CHLORIDE SERPL-SCNC: 106 MMOL/L (ref 98–107)
CO2 SERPL-SCNC: 26 MMOL/L (ref 22–29)
CREAT SERPL-MCNC: 0.76 MG/DL (ref 0.57–1)
EGFRCR SERPLBLD CKD-EPI 2021: 92.1 ML/MIN/1.73
GLUCOSE SERPL-MCNC: 147 MG/DL (ref 65–99)
POTASSIUM SERPL-SCNC: 3.5 MMOL/L (ref 3.5–5.2)
SODIUM SERPL-SCNC: 142 MMOL/L (ref 136–145)
T4 FREE SERPL-MCNC: 0.99 NG/DL (ref 0.93–1.7)
TSH SERPL DL<=0.05 MIU/L-ACNC: 3.45 UIU/ML (ref 0.27–4.2)

## 2023-03-29 PROCEDURE — 80048 BASIC METABOLIC PNL TOTAL CA: CPT

## 2023-03-29 PROCEDURE — 36415 COLL VENOUS BLD VENIPUNCTURE: CPT

## 2023-03-29 PROCEDURE — 84439 ASSAY OF FREE THYROXINE: CPT

## 2023-03-29 PROCEDURE — 84443 ASSAY THYROID STIM HORMONE: CPT

## 2023-03-30 RX ORDER — QUETIAPINE FUMARATE 100 MG/1
TABLET, FILM COATED ORAL
Qty: 90 TABLET | Refills: 3 | Status: SHIPPED | OUTPATIENT
Start: 2023-03-30

## 2023-04-04 NOTE — PROGRESS NOTES
Patient parent/guardian notified via voicemail. Advised to call back if she has any questions or concerns on 4/4 @ 8:25AM.

## 2023-05-11 DIAGNOSIS — F41.8 MIXED ANXIETY DEPRESSIVE DISORDER: ICD-10-CM

## 2023-05-12 RX ORDER — LORAZEPAM 0.5 MG/1
TABLET ORAL
Qty: 60 TABLET | Refills: 0 | Status: SHIPPED | OUTPATIENT
Start: 2023-05-12

## 2023-06-13 DIAGNOSIS — F41.8 MIXED ANXIETY DEPRESSIVE DISORDER: ICD-10-CM

## 2023-06-13 RX ORDER — LORAZEPAM 0.5 MG/1
TABLET ORAL
Qty: 60 TABLET | Refills: 0 | Status: SHIPPED | OUTPATIENT
Start: 2023-06-13

## 2023-08-31 DIAGNOSIS — F41.8 MIXED ANXIETY DEPRESSIVE DISORDER: ICD-10-CM

## 2023-09-01 RX ORDER — MEMANTINE HYDROCHLORIDE AND DONEPEZIL HYDROCHLORIDE 14; 10 MG/1; MG/1
CAPSULE ORAL
Qty: 90 CAPSULE | Refills: 0 | Status: SHIPPED | OUTPATIENT
Start: 2023-09-01

## 2023-09-01 RX ORDER — LORAZEPAM 0.5 MG/1
TABLET ORAL
Qty: 60 TABLET | Refills: 0 | Status: SHIPPED | OUTPATIENT
Start: 2023-09-01

## 2023-09-01 RX ORDER — OMEPRAZOLE 40 MG/1
CAPSULE, DELAYED RELEASE ORAL
Qty: 90 CAPSULE | Refills: 0 | Status: SHIPPED | OUTPATIENT
Start: 2023-09-01

## 2023-09-01 RX ORDER — VENLAFAXINE HYDROCHLORIDE 75 MG/1
CAPSULE, EXTENDED RELEASE ORAL
Qty: 90 CAPSULE | Refills: 0 | Status: SHIPPED | OUTPATIENT
Start: 2023-09-01

## 2023-09-01 RX ORDER — LORATADINE 10 MG/1
TABLET ORAL
Qty: 90 TABLET | Refills: 0 | Status: SHIPPED | OUTPATIENT
Start: 2023-09-01

## 2023-09-08 ENCOUNTER — TELEPHONE (OUTPATIENT)
Dept: FAMILY MEDICINE CLINIC | Facility: CLINIC | Age: 58
End: 2023-09-08
Payer: MEDICARE

## 2023-09-08 DIAGNOSIS — F41.8 MIXED ANXIETY DEPRESSIVE DISORDER: ICD-10-CM

## 2023-09-08 RX ORDER — LORAZEPAM 0.5 MG/1
0.5 TABLET ORAL 2 TIMES DAILY
Qty: 60 TABLET | Refills: 0 | Status: SHIPPED | OUTPATIENT
Start: 2023-09-08

## 2023-09-08 NOTE — TELEPHONE ENCOUNTER
[facility] staff attempted to follow warm transfer process and was unsuccessful     Caller: MANDI DUARTE    Relationship to patient: Emergency Contact    Best call back number: [phone]     Patient is needing: PATIENT IS COMPLETELY OUT OF HER LORAZEPAM AND [place] IS STATING THAT THEY HAVE NOT RECEIVED THE ORDER. PLEASE RESEND TO [facility]. PLEASE CALL AND ADVISE.

## 2023-09-11 NOTE — TELEPHONE ENCOUNTER
Patient parent/guardian notified via voicemail. Advised to call back if she has any questions or concerns on 9/11 @ 10:02AM.

## 2023-10-10 RX ORDER — QUETIAPINE FUMARATE 100 MG/1
TABLET, FILM COATED ORAL
Qty: 90 TABLET | Refills: 3 | Status: SHIPPED | OUTPATIENT
Start: 2023-10-10

## 2023-12-07 RX ORDER — OMEPRAZOLE 40 MG/1
CAPSULE, DELAYED RELEASE ORAL
Qty: 90 CAPSULE | Refills: 0 | Status: SHIPPED | OUTPATIENT
Start: 2023-12-07

## 2023-12-07 RX ORDER — VENLAFAXINE HYDROCHLORIDE 75 MG/1
CAPSULE, EXTENDED RELEASE ORAL
Qty: 90 CAPSULE | Refills: 0 | Status: SHIPPED | OUTPATIENT
Start: 2023-12-07

## 2023-12-07 RX ORDER — MEMANTINE HYDROCHLORIDE AND DONEPEZIL HYDROCHLORIDE 14; 10 MG/1; MG/1
CAPSULE ORAL
Qty: 90 CAPSULE | Refills: 0 | Status: SHIPPED | OUTPATIENT
Start: 2023-12-07

## 2023-12-07 RX ORDER — LORATADINE 10 MG/1
TABLET ORAL
Qty: 90 TABLET | Refills: 0 | Status: SHIPPED | OUTPATIENT
Start: 2023-12-07

## 2024-01-05 DIAGNOSIS — F41.8 MIXED ANXIETY DEPRESSIVE DISORDER: ICD-10-CM

## 2024-01-05 RX ORDER — LORAZEPAM 0.5 MG/1
0.5 TABLET ORAL 2 TIMES DAILY
Qty: 60 TABLET | Refills: 0 | Status: SHIPPED | OUTPATIENT
Start: 2024-01-05

## 2024-01-05 NOTE — TELEPHONE ENCOUNTER
Caller: GERALD MANDI    Relationship: Emergency Contact    Best call back number: [phone]     Requested Prescriptions:   Requested Prescriptions     Pending Prescriptions Disp Refills    LORazepam (ATIVAN) 0.5 MG tablet 60 tablet 0     Sig: Take 1 tablet by mouth 2 (Two) Times a Day.        Pharmacy where request should be sent: [facility] [place], [place] -[phone] [facility] [place], [place] - [address] [facility] [phone] [facility] [phone]      Last office visit with prescribing clinician: 3/28/2023   Last telemedicine visit with prescribing clinician: Visit date not found   Next office visit with prescribing clinician: Visit date not found     Additional details provided by patient: URGENT OUT    Does the patient have less than a 3 day supply:  [x] Yes  [] No    Would you like a call back once the refill request has been completed: [x] Yes [] No    If the office needs to give you a call back, can they leave a voicemail: [] Yes [] No    Steven Miguel   01/05/24 15:56 EST

## 2024-01-24 RX ORDER — MEMANTINE HYDROCHLORIDE AND DONEPEZIL HYDROCHLORIDE 14; 10 MG/1; MG/1
CAPSULE ORAL
Qty: 90 CAPSULE | Refills: 0 | Status: SHIPPED | OUTPATIENT
Start: 2024-01-24

## 2024-02-22 RX ORDER — VENLAFAXINE HYDROCHLORIDE 75 MG/1
CAPSULE, EXTENDED RELEASE ORAL
Qty: 30 CAPSULE | Refills: 3 | Status: SHIPPED | OUTPATIENT
Start: 2024-02-22

## 2024-02-22 RX ORDER — OMEPRAZOLE 40 MG/1
CAPSULE, DELAYED RELEASE ORAL
Qty: 30 CAPSULE | Refills: 3 | Status: SHIPPED | OUTPATIENT
Start: 2024-02-22

## 2024-02-22 RX ORDER — LORATADINE 10 MG/1
TABLET ORAL
Qty: 30 TABLET | Refills: 3 | Status: SHIPPED | OUTPATIENT
Start: 2024-02-22

## 2024-02-24 DIAGNOSIS — F41.8 MIXED ANXIETY DEPRESSIVE DISORDER: ICD-10-CM

## 2024-02-26 RX ORDER — LORAZEPAM 0.5 MG/1
0.5 TABLET ORAL 2 TIMES DAILY
Qty: 60 TABLET | OUTPATIENT
Start: 2024-02-26

## 2024-02-27 DIAGNOSIS — F41.8 MIXED ANXIETY DEPRESSIVE DISORDER: ICD-10-CM

## 2024-02-27 RX ORDER — LORAZEPAM 0.5 MG/1
0.5 TABLET ORAL 2 TIMES DAILY
Qty: 60 TABLET | Refills: 0 | OUTPATIENT
Start: 2024-02-27

## 2024-02-29 ENCOUNTER — LAB (OUTPATIENT)
Dept: FAMILY MEDICINE CLINIC | Facility: CLINIC | Age: 59
End: 2024-02-29
Payer: MEDICARE

## 2024-02-29 ENCOUNTER — OFFICE VISIT (OUTPATIENT)
Dept: FAMILY MEDICINE CLINIC | Facility: CLINIC | Age: 59
End: 2024-02-29
Payer: MEDICARE

## 2024-02-29 VITALS
OXYGEN SATURATION: 98 % | HEIGHT: 65 IN | TEMPERATURE: 95.6 F | HEART RATE: 79 BPM | WEIGHT: 138.8 LBS | SYSTOLIC BLOOD PRESSURE: 123 MMHG | BODY MASS INDEX: 23.13 KG/M2 | DIASTOLIC BLOOD PRESSURE: 83 MMHG | RESPIRATION RATE: 16 BRPM

## 2024-02-29 DIAGNOSIS — E03.9 HYPOTHYROIDISM, UNSPECIFIED TYPE: Primary | ICD-10-CM

## 2024-02-29 DIAGNOSIS — F41.8 MIXED ANXIETY DEPRESSIVE DISORDER: ICD-10-CM

## 2024-02-29 DIAGNOSIS — K21.9 GASTROESOPHAGEAL REFLUX DISEASE, UNSPECIFIED WHETHER ESOPHAGITIS PRESENT: ICD-10-CM

## 2024-02-29 DIAGNOSIS — E03.9 HYPOTHYROIDISM, UNSPECIFIED TYPE: ICD-10-CM

## 2024-02-29 DIAGNOSIS — Z12.31 SCREENING MAMMOGRAM FOR BREAST CANCER: ICD-10-CM

## 2024-02-29 PROCEDURE — 80061 LIPID PANEL: CPT | Performed by: FAMILY MEDICINE

## 2024-02-29 PROCEDURE — 36415 COLL VENOUS BLD VENIPUNCTURE: CPT

## 2024-02-29 PROCEDURE — 84443 ASSAY THYROID STIM HORMONE: CPT | Performed by: FAMILY MEDICINE

## 2024-02-29 RX ORDER — LORAZEPAM 0.5 MG/1
0.5 TABLET ORAL 2 TIMES DAILY
Qty: 60 TABLET | Refills: 0 | Status: SHIPPED | OUTPATIENT
Start: 2024-02-29

## 2024-02-29 NOTE — PROGRESS NOTES
Subjective   Erna Shine is a 58 y.o. female.     History of Present Illness     Anxiety  Patient present with mother for follow-up on anxiety depression. Patient is a poor historian due to autistic disorder.  Per mother she has noticed some inappropriate behavior recently she does play with her but no aggressive behavior issues.  She denies depressed mood, irritability, mood swings, trouble sleep or shortness of breath. The severity of symptoms is mild.  She is taking lorazepam, Seroquel and Effexor.  Hypothyroidism  The patient also  presents with  f/u on  hypothyroidism.  She complains of anxiety but denies chest pain, palpitations, shortness of breath  and trouble swallowing. She is taking medication as directed..    The following portions of the patient's history were reviewed and updated as appropriate: past medical history, past social history, past surgical history and problem list.    Review of Systems   Constitutional:  Negative for appetite change.   Respiratory:  Negative for cough and shortness of breath.    Cardiovascular:  Negative for chest pain and palpitations.   Gastrointestinal:  Negative for abdominal pain, nausea and indigestion.   Psychiatric/Behavioral:  Negative for sleep disturbance and depressed mood. The patient is nervous/anxious.        Objective   Physical Exam  Vitals reviewed.   Cardiovascular:      Heart sounds: Normal heart sounds.   Pulmonary:      Effort: Pulmonary effort is normal.      Breath sounds: Normal breath sounds.   Abdominal:      Tenderness: There is no abdominal tenderness.   Neurological:      Mental Status: She is alert and oriented to person, place, and time.   Psychiatric:         Mood and Affect: Mood is anxious.         Behavior: Behavior normal.         Vitals:    02/29/24 1254   BP: 123/83   Pulse: 79   Resp: 16   Temp: 95.6 °F (35.3 °C)   SpO2: 98%     Current Outpatient Medications on File Prior to Visit   Medication Sig Dispense Refill    loratadine  (Allergy Relief) 10 MG tablet TAKE ONE TABLET BY MOUTH DAILY 30 tablet 3    Namzaric 14-10 MG capsule sustained-release 24 hr TAKE ONE CAPSULE BY MOUTH DAILY 90 capsule 0    omeprazole (priLOSEC) 40 MG capsule TAKE ONE CAPSULE BY MOUTH DAILY 30 capsule 3    QUEtiapine (SEROquel) 100 MG tablet TAKE ONE TABLET BY MOUTH THREE TIMES DAILY 90 tablet 3    venlafaxine XR (EFFEXOR-XR) 75 MG 24 hr capsule TAKE ONE CAPSULE BY MOUTH DAILY WITH FOOD 30 capsule 3     No current facility-administered medications on file prior to visit.         Assessment & Plan   Problems Addressed this Visit       Hypothyroidism - Primary     Stable on levothyroxine.  We will check TSH level.         Relevant Orders    Comprehensive metabolic panel    TSH (Completed)    Lipid panel (Completed)    GERD (gastroesophageal reflux disease)     Symptoms are improving continue current treatment regimen.  Discussed dietary changes and lifestyle modifications.          Mixed anxiety depressive disorder     Patient's depression is a recurrent episode that is mild without psychosis. Depression is active and improving with treatment.  Plan:   Continue current medication therapy   Follow up in 3 months.          Relevant Medications    LORazepam (ATIVAN) 0.5 MG tablet    Other Relevant Orders    Comprehensive metabolic panel    TSH (Completed)    Screening mammogram for breast cancer    Relevant Orders    Mammo Screening Digital Tomosynthesis Bilateral With CAD     Diagnoses         Codes Comments    Hypothyroidism, unspecified type    -  Primary ICD-10-CM: E03.9  ICD-9-CM: 244.9     Mixed anxiety depressive disorder     ICD-10-CM: F41.8  ICD-9-CM: 300.4     Screening mammogram for breast cancer     ICD-10-CM: Z12.31  ICD-9-CM: V76.12     Gastroesophageal reflux disease, unspecified whether esophagitis present     ICD-10-CM: K21.9  ICD-9-CM: 530.81

## 2024-03-01 LAB
CHOLEST SERPL-MCNC: 164 MG/DL (ref 0–200)
HDLC SERPL-MCNC: 48 MG/DL (ref 40–60)
LDLC SERPL CALC-MCNC: 96 MG/DL (ref 0–100)
LDLC/HDLC SERPL: 1.96 {RATIO}
TRIGL SERPL-MCNC: 109 MG/DL (ref 0–150)
TSH SERPL DL<=0.05 MIU/L-ACNC: 1.64 UIU/ML (ref 0.27–4.2)
VLDLC SERPL-MCNC: 20 MG/DL (ref 5–40)

## 2024-03-01 NOTE — ASSESSMENT & PLAN NOTE
Patient's depression is a recurrent episode that is mild without psychosis. Depression is active and improving with treatment.  Plan:   Continue current medication therapy   Follow up in 3 months.

## 2024-03-01 NOTE — PROGRESS NOTES
Patient notified via voicemail. Advised to call back if she has any questions or concerns on 3/1 @ 2:27PM.

## 2024-04-16 DIAGNOSIS — F41.8 MIXED ANXIETY DEPRESSIVE DISORDER: ICD-10-CM

## 2024-04-16 RX ORDER — LORAZEPAM 0.5 MG/1
0.5 TABLET ORAL 2 TIMES DAILY
Qty: 60 TABLET | Refills: 0 | Status: SHIPPED | OUTPATIENT
Start: 2024-04-16

## 2024-04-16 RX ORDER — LORAZEPAM 0.5 MG/1
0.5 TABLET ORAL 2 TIMES DAILY
Qty: 60 TABLET | Refills: 0 | Status: SHIPPED | OUTPATIENT
Start: 2024-04-16 | End: 2024-04-16 | Stop reason: SDUPTHER

## 2024-04-16 NOTE — TELEPHONE ENCOUNTER
Caller: JUANITO GUZMÁN    Relationship: Caregiver (non-relative)    Best call back number: [phone]     Requested Prescriptions:   Requested Prescriptions     Pending Prescriptions Disp Refills    LORazepam (ATIVAN) 0.5 MG tablet 60 tablet 0     Sig: Take 1 tablet by mouth 2 (Two) Times a Day.        Pharmacy where request should be sent: [facility] [id number] [facility], [place] - [address] - [phone]  - [phone] FX     Last office visit with prescribing clinician: 2/29/2024   Last telemedicine visit with prescribing clinician: Visit date not found   Next office visit with prescribing clinician: 6/4/2024     Additional details provided by patient: PATIENT HAS 4 TABLETS REMAINING     Does the patient have less than a 3 day supply:  [x] Yes  [] No    Would you like a call back once the refill request has been completed: [x] Yes [] No    If the office needs to give you a call back, can they leave a voicemail: [x] Yes [] No    Katerina Cox Rep   04/16/24 08:36 EDT

## 2024-04-23 RX ORDER — MEMANTINE HYDROCHLORIDE AND DONEPEZIL HYDROCHLORIDE 14; 10 MG/1; MG/1
CAPSULE ORAL
Qty: 90 CAPSULE | Refills: 0 | Status: SHIPPED | OUTPATIENT
Start: 2024-04-23

## 2024-05-28 DIAGNOSIS — F41.8 MIXED ANXIETY DEPRESSIVE DISORDER: ICD-10-CM

## 2024-05-28 RX ORDER — QUETIAPINE FUMARATE 100 MG/1
TABLET, FILM COATED ORAL
Qty: 90 TABLET | Refills: 3 | Status: SHIPPED | OUTPATIENT
Start: 2024-05-28

## 2024-05-28 RX ORDER — LORAZEPAM 0.5 MG/1
0.5 TABLET ORAL 2 TIMES DAILY
Qty: 60 TABLET | Refills: 0 | Status: SHIPPED | OUTPATIENT
Start: 2024-05-28

## 2024-05-28 NOTE — TELEPHONE ENCOUNTER
Caller: JUANITO-CARE GIVER    Best call back number: [phone]    Requested Prescriptions:   Requested Prescriptions     Pending Prescriptions Disp Refills    LORazepam (ATIVAN) 0.5 MG tablet 60 tablet 0     Sig: Take 1 tablet by mouth 2 (Two) Times a Day.        Pharmacy where request should be sent: [facility] [id number] [facility], [place] - [address] - [phone]  - [phone] FX     Last office visit with prescribing clinician: 2/29/2024   Last telemedicine visit with prescribing clinician: Visit date not found   Next office visit with prescribing clinician: 6/4/2024     Does the patient have less than a 3 day supply:  [x] Yes  [] No    Katerina Walls Rep   05/28/24 08:22 EDT

## 2024-07-03 DIAGNOSIS — F41.8 MIXED ANXIETY DEPRESSIVE DISORDER: ICD-10-CM

## 2024-07-03 NOTE — TELEPHONE ENCOUNTER
Caller: JUANITO GUZMÁN    Relationship: Caregiver (non-relative)    Best call back number:     [phone]       Requested Prescriptions:   Requested Prescriptions     Pending Prescriptions Disp Refills    LORazepam (ATIVAN) 0.5 MG tablet 60 tablet 0     Sig: Take 1 tablet by mouth 2 (Two) Times a Day.        Pharmacy where request should be sent: [facility] [id number] [facility], [place] - [address] - [phone]  - [phone] FX     Last office visit with prescribing clinician: 2/29/2024   Last telemedicine visit with prescribing clinician: Visit date not found   Next office visit with prescribing clinician: Visit date not found     Additional details provided by patient: WILL RUN OUT ON FRIDAY     Does the patient have less than a 3 day supply:  [x] Yes  [] No    Would you like a call back once the refill request has been completed: [] Yes [x] No    If the office needs to give you a call back, can they leave a voicemail: [] Yes [x] No    Katerina Quiñones Rep   07/03/24 10:24 EDT

## 2024-07-08 RX ORDER — LORAZEPAM 0.5 MG/1
0.5 TABLET ORAL 2 TIMES DAILY
Qty: 60 TABLET | Refills: 0 | Status: SHIPPED | OUTPATIENT
Start: 2024-07-08

## 2024-07-16 RX ORDER — VENLAFAXINE HYDROCHLORIDE 75 MG/1
CAPSULE, EXTENDED RELEASE ORAL
Qty: 60 CAPSULE | Refills: 0 | Status: SHIPPED | OUTPATIENT
Start: 2024-07-16

## 2024-08-08 DIAGNOSIS — F41.8 MIXED ANXIETY DEPRESSIVE DISORDER: ICD-10-CM

## 2024-08-08 RX ORDER — LORAZEPAM 0.5 MG/1
0.5 TABLET ORAL 2 TIMES DAILY
Qty: 60 TABLET | Refills: 0 | OUTPATIENT
Start: 2024-08-08

## 2024-08-08 RX ORDER — LORATADINE 10 MG/1
10 TABLET ORAL DAILY
Qty: 30 TABLET | Refills: 3 | Status: SHIPPED | OUTPATIENT
Start: 2024-08-08

## 2024-08-08 NOTE — TELEPHONE ENCOUNTER
Prescription denied patient has to wait until  her appointment next week because she has 3 cancellation and 1 no-show since last visit. thanks

## 2024-08-08 NOTE — TELEPHONE ENCOUNTER
Caller: TUCKER GUZMÁNS    Relationship: Emergency Contact    Best call back number: [phone]    Requested Prescriptions:   Requested Prescriptions     Pending Prescriptions Disp Refills    LORazepam (ATIVAN) 0.5 MG tablet 60 tablet 0     Sig: Take 1 tablet by mouth 2 (Two) Times a Day.        Pharmacy where request should be sent: [facility] [id number] [facility], [place] - [address] - [phone]  - [phone] FX     Last office visit with prescribing clinician: 2/29/2024   Last telemedicine visit with prescribing clinician: Visit date not found   Next office visit with prescribing clinician: 8/15/2024     Additional details provided by patient: PATIENT WILL BE OUT AS OF SATURDAY 8/10/24    Does the patient have less than a 3 day supply:  [x] Yes  [] No    Would you like a call back once the refill request has been completed: [] Yes [x] No    If the office needs to give you a call back, can they leave a voicemail: [] Yes [x] No    Katerina Trotter   08/08/24 09:07 EDT

## 2024-08-12 RX ORDER — OMEPRAZOLE 40 MG/1
40 CAPSULE, DELAYED RELEASE ORAL DAILY
Qty: 30 CAPSULE | Refills: 3 | Status: SHIPPED | OUTPATIENT
Start: 2024-08-12

## 2024-08-15 ENCOUNTER — LAB (OUTPATIENT)
Dept: FAMILY MEDICINE CLINIC | Facility: CLINIC | Age: 59
End: 2024-08-15
Payer: MEDICARE

## 2024-08-15 ENCOUNTER — OFFICE VISIT (OUTPATIENT)
Dept: FAMILY MEDICINE CLINIC | Facility: CLINIC | Age: 59
End: 2024-08-15
Payer: MEDICARE

## 2024-08-15 VITALS
OXYGEN SATURATION: 96 % | HEIGHT: 62 IN | BODY MASS INDEX: 28.34 KG/M2 | RESPIRATION RATE: 16 BRPM | DIASTOLIC BLOOD PRESSURE: 87 MMHG | TEMPERATURE: 97.5 F | HEART RATE: 111 BPM | SYSTOLIC BLOOD PRESSURE: 129 MMHG | WEIGHT: 154 LBS

## 2024-08-15 DIAGNOSIS — Z00.00 MEDICARE ANNUAL WELLNESS VISIT, SUBSEQUENT: Primary | ICD-10-CM

## 2024-08-15 DIAGNOSIS — F41.8 MIXED ANXIETY DEPRESSIVE DISORDER: ICD-10-CM

## 2024-08-15 RX ORDER — LORAZEPAM 0.5 MG/1
0.5 TABLET ORAL 2 TIMES DAILY
Qty: 60 TABLET | Refills: 0 | Status: SHIPPED | OUTPATIENT
Start: 2024-08-15

## 2024-08-15 NOTE — ASSESSMENT & PLAN NOTE
Patient's depression is a recurrent episode that is mild without psychosis. Depression is active and stable.    Plan:   Continue current medication therapy     Followup in 3 months.

## 2024-08-15 NOTE — ASSESSMENT & PLAN NOTE
Discussed healthy diet and  importance of regular exercise.  Advise low-cholesterol diet, sufficient intake of fresh fruits and vegetables.

## 2024-08-15 NOTE — PROGRESS NOTES
Subjective   The ABCs of the Annual Wellness Visit  Medicare Wellness Visit      Erna Shine is a 59 y.o. patient who presents for a Medicare Wellness Visit.    The following portions of the patient's history were reviewed and   updated as appropriate: past family history, past medical history, past social history, past surgical history, and problem list.    Compared to one year ago, the patient's physical   health is the same.  Compared to one year ago, the patient's mental   health is the same.    Recent Hospitalizations:  She was not admitted to the hospital during the last year.     Current Medical Providers:  Patient Care Team:  Marilyn Pacheco MD as PCP - General (Family Medicine)    Outpatient Medications Prior to Visit   Medication Sig Dispense Refill    loratadine (CLARITIN) 10 MG tablet TAKE 1 TABLET BY MOUTH DAILY 30 tablet 3    Namzaric 14-10 MG capsule sustained-release 24 hr TAKE ONE CAPSULE BY MOUTH DAILY 90 capsule 0    omeprazole (priLOSEC) 40 MG capsule TAKE 1 CAPSULE BY MOUTH DAILY 30 capsule 3    QUEtiapine (SEROquel) 100 MG tablet TAKE ONE TABLET BY MOUTH THREE TIMES DAILY 90 tablet 3    venlafaxine XR (EFFEXOR-XR) 75 MG 24 hr capsule TAKE ONE CAPSULE BY MOUTH DAILY WITH FOOD 60 capsule 0    LORazepam (ATIVAN) 0.5 MG tablet Take 1 tablet by mouth 2 (Two) Times a Day. 60 tablet 0     No facility-administered medications prior to visit.     No opioid medication identified on active medication list. I have reviewed chart for other potential  high risk medication/s and harmful drug interactions in the elderly.      Aspirin is not on active medication list.  Aspirin use is not indicated based on review of current medical condition/s. Risk of harm outweighs potential benefits.  .    Patient Active Problem List   Diagnosis    SBO (small bowel obstruction)    Hypothyroidism    GERD (gastroesophageal reflux disease)    Mixed anxiety depressive disorder    Vision changes    Medicare annual  "wellness visit, subsequent    Screening mammogram for breast cancer    Urinary frequency    Mental and behavioral problem    Autism disorder               Objective   Vitals:    08/15/24 1446   BP: 129/87   BP Location: Left arm   Patient Position: Sitting   Cuff Size: Adult   Pulse: 111   Resp: 16   Temp: 97.5 °F (36.4 °C)   TempSrc: Infrared   SpO2: 96%   Weight: 69.9 kg (154 lb)   Height: 157.5 cm (62\")       Estimated body mass index is 28.17 kg/m² as calculated from the following:    Height as of this encounter: 157.5 cm (62\").    Weight as of this encounter: 69.9 kg (154 lb).    BMI is >= 25 and <30. (Overweight) The following options were offered after discussion;: exercise counseling/recommendations and nutrition counseling/recommendations       Does the patient have evidence of cognitive impairment? Yes                                                                                                Health  Risk Assessment    Smoking Status:  Social History     Tobacco Use   Smoking Status Never   Smokeless Tobacco Never     Alcohol Consumption:  Social History     Substance and Sexual Activity   Alcohol Use No       Fall Risk Screen  STEADI Fall Risk Assessment was completed, and patient is at MODERATE risk for falls. Assessment completed on:8/15/2024    Depression Screenin/15/2024     3:05 PM   PHQ-2/PHQ-9 Depression Screening   Little Interest or Pleasure in Doing Things 0-->not at all   Feeling Down, Depressed or Hopeless 0-->not at all   PHQ-9: Brief Depression Severity Measure Score 0     Health Habits and Functional and Cognitive Screenin/15/2024     3:05 PM   Functional & Cognitive Status   Do you have difficulty preparing food and eating? Yes   Do you have difficulty bathing yourself, getting dressed or grooming yourself? Yes   Do you have difficulty using the toilet? No   Do you have difficulty moving around from place to place? Yes   Do you have trouble with steps or getting out of " a bed or a chair? No   Current Diet Unhealthy Diet   Dental Exam Not up to date   Eye Exam Not up to date   Are you deaf or do you have serious difficulty hearing?  No   Do you need help to go to places out of walking distance? Yes   Do you need help shopping? Yes   Do you need help preparing meals?  Yes   Do you need help with housework?  Yes   Do you need help with laundry? Yes   Do you need help taking your medications? Yes   Have you felt unusual stress, anger or loneliness in the last month? No   Who do you live with? Other   If you need help, do you have trouble finding someone available to you? No   Do you have difficulty concentrating, remembering or making decisions? Yes           Age-appropriate Screening Schedule:  Refer to the list below for future screening recommendations based on patient's age, sex and/or medical conditions. Orders for these recommended tests are listed in the plan section. The patient has been provided with a written plan.    Health Maintenance List  Health Maintenance   Topic Date Due    MAMMOGRAM  05/30/2015    INFLUENZA VACCINE  08/01/2024    ZOSTER VACCINE (1 of 2) 08/15/2024 (Originally 3/13/2017)    BMI FOLLOWUP  08/16/2024 (Originally 8/2/2023)    COVID-19 Vaccine (3 - 2023-24 season) 08/17/2024 (Originally 9/1/2023)    PAP SMEAR  10/07/2024 (Originally 4/20/2021)    HEPATITIS C SCREENING  02/28/2025 (Originally 4/20/2021)    TDAP/TD VACCINES (2 - Td or Tdap) 09/06/2024    ANNUAL WELLNESS VISIT  08/15/2025    COLORECTAL CANCER SCREENING  12/03/2029    Pneumococcal Vaccine 0-64  Aged Out                                                                                                                                                CMS Preventative Services Quick Reference  Risk Factors Identified During Encounter  Dental Screening Recommended  Vision Screening Recommended    The above risks/problems have been discussed with the patient.  Pertinent information has been shared with  "the patient in the After Visit Summary.  An After Visit Summary and PPPS were made available to the patient.    Follow Up:   Next Medicare Wellness visit to be scheduled in 1 year.         Additional E&M Note during same encounter follows:  Patient has additional, significant, and separately identifiable condition(s)/problem(s) that require work above and beyond the Medicare Wellness Visit     Chief Complaint  Medicare Wellness-subsequent and Anxiety (Follow up)    Subjective   HPI    Anxiety  Patient also present for follow-up on anxiety depression. Patient is a poor historian due to autistic disorder.  Per caregiver symptoms are stable. She  denies mood, irritability, mood swings, trouble sleep or shortness of breath. The severity of symptoms is mild.  She is taking lorazepam, Seroquel and Effexor.    Review of Systems   Constitutional:  Negative for activity change and appetite change.   HENT:  Negative for trouble swallowing.    Respiratory:  Negative for cough, shortness of breath and wheezing.    Cardiovascular:  Negative for chest pain.   Gastrointestinal:  Negative for abdominal pain, constipation, nausea and vomiting.   Genitourinary:  Negative for dysuria.   Neurological:  Negative for dizziness.   Psychiatric/Behavioral:  The patient is not nervous/anxious.         Objective   Vital Signs:  /87 (BP Location: Left arm, Patient Position: Sitting, Cuff Size: Adult)   Pulse 111   Temp 97.5 °F (36.4 °C) (Infrared)   Resp 16   Ht 157.5 cm (62\")   Wt 69.9 kg (154 lb)   SpO2 96%   BMI 28.17 kg/m²   Physical Exam  Vitals reviewed.   Constitutional:       Appearance: She is well-developed.   Neck:      Thyroid: No thyromegaly.   Cardiovascular:      Heart sounds: Normal heart sounds.   Pulmonary:      Effort: Pulmonary effort is normal.      Breath sounds: Normal breath sounds. No wheezing.   Abdominal:      Tenderness: There is no abdominal tenderness.   Neurological:      General: No focal deficit " present.      Mental Status: She is alert. Mental status is at baseline.   Psychiatric:         Mood and Affect: Mood normal.               Assessment and Plan          Medicare annual wellness visit, subsequent  Discussed healthy diet and  importance of regular exercise.  Advise low-cholesterol diet, sufficient intake of fresh fruits and vegetables.  Mixed anxiety depressive disorder  Patient's depression is a recurrent episode that is mild without psychosis. Depression is active and stable.    Plan:   Continue current medication therapy     Followup in 3 months.     Orders Placed This Encounter   Procedures    Comprehensive metabolic panel     Standing Status:   Future     Standing Expiration Date:   8/15/2025     Order Specific Question:   Release to patient     Answer:   Routine Release [[id number]]    CBC No Differential     Standing Status:   Future     Standing Expiration Date:   8/15/2025     Order Specific Question:   Release to patient     Answer:   Routine Release [[id number]]     New Medications Ordered This Visit   Medications    LORazepam (ATIVAN) 0.5 MG tablet     Sig: Take 1 tablet by mouth 2 (Two) Times a Day.     Dispense:  60 tablet     Refill:  0          Follow Up   Return in about 3 months (around 11/15/2024).  Patient was given instructions and counseling regarding her condition or for health maintenance advice. Please see specific information pulled into the AVS if appropriate.

## 2024-09-05 ENCOUNTER — TELEPHONE (OUTPATIENT)
Dept: FAMILY MEDICINE CLINIC | Facility: CLINIC | Age: 59
End: 2024-09-05
Payer: MEDICARE

## 2024-09-05 NOTE — TELEPHONE ENCOUNTER
Caller: JUANITO GUZMÁN    Relationship to patient: Emergency Contact    Best call back number: [phone]    Patient is needing: CAREGIVER STATES THAT PATIENT NEEDS TO HAVE HER THYROID MEDICATION SENT IN, THAT SHE IS WANTING TO TAKE IT NOW.

## 2024-09-05 NOTE — TELEPHONE ENCOUNTER
Thyroid labs were normal and 2/2024.  The patient is not taking any thyroid medicine.  Why she is asking now?

## 2024-09-05 NOTE — TELEPHONE ENCOUNTER
Spoke to Patients caregiver to let her know that patient thyroid panel was normal in 02/2024. Caregiver stated that she needs the thyroid medication due to  patient not have a thyroid. I let the caregiver know that there is no prescription in the patient chart indicating she was on any thyroid medication. Caregiver sounded confused about this. And said to forget it.

## 2024-09-20 DIAGNOSIS — F41.8 MIXED ANXIETY DEPRESSIVE DISORDER: ICD-10-CM

## 2024-09-23 RX ORDER — LORAZEPAM 0.5 MG/1
0.5 TABLET ORAL 2 TIMES DAILY
Qty: 60 TABLET | Refills: 0 | Status: SHIPPED | OUTPATIENT
Start: 2024-09-23

## 2024-09-30 RX ORDER — MEMANTINE HYDROCHLORIDE AND DONEPEZIL HYDROCHLORIDE 14; 10 MG/1; MG/1
CAPSULE ORAL
Qty: 90 CAPSULE | Refills: 3 | Status: SHIPPED | OUTPATIENT
Start: 2024-09-30

## 2024-09-30 RX ORDER — VENLAFAXINE HYDROCHLORIDE 75 MG/1
CAPSULE, EXTENDED RELEASE ORAL
Qty: 60 CAPSULE | Refills: 3 | Status: SHIPPED | OUTPATIENT
Start: 2024-09-30

## 2024-10-22 DIAGNOSIS — F41.8 MIXED ANXIETY DEPRESSIVE DISORDER: ICD-10-CM

## 2024-10-22 NOTE — TELEPHONE ENCOUNTER
Caller: TUCKER GUZMÁNS    Relationship: Emergency Contact    Best call back number: [phone]    Requested Prescriptions:   Requested Prescriptions     Pending Prescriptions Disp Refills    LORazepam (ATIVAN) 0.5 MG tablet 60 tablet 0     Sig: Take 1 tablet by mouth 2 (Two) Times a Day.        Pharmacy where request should be sent: [facility] [id number] [facility], [place] - [address] - [phone]  - [phone] FX     Last office visit with prescribing clinician: 8/15/2024   Last telemedicine visit with prescribing clinician: Visit date not found   Next office visit with prescribing clinician: Visit date not found       Does the patient have less than a 3 day supply:  [x] Yes  [] No    Katerina Walls Rep   10/22/24 09:28 EDT

## 2024-10-23 RX ORDER — LORAZEPAM 0.5 MG/1
0.5 TABLET ORAL 2 TIMES DAILY
Qty: 60 TABLET | Refills: 0 | Status: SHIPPED | OUTPATIENT
Start: 2024-10-23

## 2024-12-02 DIAGNOSIS — F41.8 MIXED ANXIETY DEPRESSIVE DISORDER: ICD-10-CM

## 2024-12-02 NOTE — TELEPHONE ENCOUNTER
Caller: JUANITO GUZMÁN    Relationship: Emergency Contact    Best call back number: [phone]    Requested Prescriptions:   Requested Prescriptions     Pending Prescriptions Disp Refills    LORazepam (ATIVAN) 0.5 MG tablet 60 tablet 0     Sig: Take 1 tablet by mouth 2 (Two) Times a Day.        Pharmacy where request should be sent: [facility] [id number] [facility], [place] - [address] - [phone] PH - [phone] FX     Last office visit with prescribing clinician: 8/15/2024   Last telemedicine visit with prescribing clinician: Visit date not found   Next office visit with prescribing clinician: Visit date not found     Additional details provided by patient: PATIENT ONLY HAS 1 TABLET LEFT    Does the patient have less than a 3 day supply:  [x] Yes  [] No    Would you like a call back once the refill request has been completed: [] Yes [] No    If the office needs to give you a call back, can they leave a voicemail: [] Yes [] No    Katerina Alonzo Rep   12/02/24 13:59 EST

## 2024-12-03 RX ORDER — LORAZEPAM 0.5 MG/1
0.5 TABLET ORAL 2 TIMES DAILY
Qty: 60 TABLET | Refills: 0 | Status: SHIPPED | OUTPATIENT
Start: 2024-12-03

## 2024-12-15 RX ORDER — OMEPRAZOLE 40 MG/1
40 CAPSULE, DELAYED RELEASE ORAL DAILY
Qty: 30 CAPSULE | Refills: 3 | Status: SHIPPED | OUTPATIENT
Start: 2024-12-15

## 2024-12-30 DIAGNOSIS — F41.8 MIXED ANXIETY DEPRESSIVE DISORDER: ICD-10-CM

## 2024-12-31 RX ORDER — LORAZEPAM 0.5 MG/1
0.5 TABLET ORAL 2 TIMES DAILY
Qty: 60 TABLET | Refills: 0 | Status: SHIPPED | OUTPATIENT
Start: 2024-12-31

## 2025-01-07 RX ORDER — QUETIAPINE FUMARATE 100 MG/1
TABLET, FILM COATED ORAL
Qty: 180 TABLET | Refills: 1 | Status: SHIPPED | OUTPATIENT
Start: 2025-01-07

## 2025-02-07 DIAGNOSIS — F41.8 MIXED ANXIETY DEPRESSIVE DISORDER: ICD-10-CM

## 2025-02-07 RX ORDER — LORAZEPAM 0.5 MG/1
0.5 TABLET ORAL 2 TIMES DAILY
Qty: 60 TABLET | Refills: 0 | Status: SHIPPED | OUTPATIENT
Start: 2025-02-07

## 2025-02-07 NOTE — TELEPHONE ENCOUNTER
Caller: JUANITO GUZMÁN    Relationship: Emergency Contact    Best call back number: [phone]     Requested Prescriptions:   Requested Prescriptions     Pending Prescriptions Disp Refills    LORazepam (ATIVAN) 0.5 MG tablet 60 tablet 0     Sig: Take 1 tablet by mouth 2 (Two) Times a Day.        Pharmacy where request should be sent: [facility] [id number] [facility], [place] - [address] - [phone] PH - [phone] FX     Last office visit with prescribing clinician: 8/15/2024   Last telemedicine visit with prescribing clinician: Visit date not found   Next office visit with prescribing clinician: Visit date not found     Additional details provided by patient:     Does the patient have less than a 3 day supply:  [x] Yes  [] No    Would you like a call back once the refill request has been completed: [] Yes [] No    If the office needs to give you a call back, can they leave a voicemail: [] Yes [] No    Katerina Iverson Rep   02/07/25 11:45 EST

## 2025-02-17 RX ORDER — LORATADINE 10 MG/1
10 TABLET ORAL DAILY
Qty: 30 TABLET | Refills: 3 | Status: SHIPPED | OUTPATIENT
Start: 2025-02-17

## 2025-03-09 DIAGNOSIS — F41.8 MIXED ANXIETY DEPRESSIVE DISORDER: ICD-10-CM

## 2025-03-10 RX ORDER — LORAZEPAM 0.5 MG/1
0.5 TABLET ORAL 2 TIMES DAILY
Qty: 60 TABLET | Refills: 0 | Status: SHIPPED | OUTPATIENT
Start: 2025-03-10

## 2025-04-07 ENCOUNTER — OFFICE VISIT (OUTPATIENT)
Dept: FAMILY MEDICINE CLINIC | Facility: CLINIC | Age: 60
End: 2025-04-07
Payer: MEDICARE

## 2025-04-07 VITALS
OXYGEN SATURATION: 98 % | HEIGHT: 62 IN | SYSTOLIC BLOOD PRESSURE: 117 MMHG | DIASTOLIC BLOOD PRESSURE: 82 MMHG | BODY MASS INDEX: 31.96 KG/M2 | TEMPERATURE: 97.7 F | RESPIRATION RATE: 16 BRPM | HEART RATE: 107 BPM | WEIGHT: 173.7 LBS

## 2025-04-07 DIAGNOSIS — R73.9 HYPERGLYCEMIA: ICD-10-CM

## 2025-04-07 DIAGNOSIS — K21.9 GASTROESOPHAGEAL REFLUX DISEASE, UNSPECIFIED WHETHER ESOPHAGITIS PRESENT: ICD-10-CM

## 2025-04-07 DIAGNOSIS — F41.8 MIXED ANXIETY DEPRESSIVE DISORDER: Primary | ICD-10-CM

## 2025-04-07 DIAGNOSIS — J30.2 SEASONAL ALLERGIES: ICD-10-CM

## 2025-04-07 PROCEDURE — 99214 OFFICE O/P EST MOD 30 MIN: CPT | Performed by: FAMILY MEDICINE

## 2025-04-07 PROCEDURE — 1160F RVW MEDS BY RX/DR IN RCRD: CPT | Performed by: FAMILY MEDICINE

## 2025-04-07 PROCEDURE — 1159F MED LIST DOCD IN RCRD: CPT | Performed by: FAMILY MEDICINE

## 2025-04-07 RX ORDER — VENLAFAXINE HYDROCHLORIDE 75 MG/1
75 CAPSULE, EXTENDED RELEASE ORAL DAILY
Qty: 90 CAPSULE | Refills: 3 | Status: SHIPPED | OUTPATIENT
Start: 2025-04-07

## 2025-04-07 RX ORDER — LORATADINE 10 MG/1
10 TABLET ORAL DAILY
Qty: 30 TABLET | Refills: 3 | Status: SHIPPED | OUTPATIENT
Start: 2025-04-07

## 2025-04-07 NOTE — PROGRESS NOTES
Subjective   Erna Shine is a 60 y.o. female.     History of Present Illness     History of Present Illness  The patient is a 60-year-old female who presents for a follow-up visit on hypothyroidism, anxiety, and GERD.  History is reported by care giver in the presence of the patient. She has been experiencing an escalation in her anxiety symptoms, particularly when venturing outside. Despite adhering to her prescribed regimen of lorazepam and Effexor. However, she often chooses to remain at home, as social interactions appear to exacerbate her anxiety.  She does go to a special needs care center twice a week.    She has been compliant with her omeprazole treatment for heartburn and reports no current gastrointestinal issues heartburn, nausea or vomiting.      The following portions of the patient's history were reviewed and updated as appropriate: past medical history, past social history, past surgical history and problem list.    Review of Systems   Constitutional:  Negative for appetite change.   HENT:  Positive for congestion.         Runny nose   Respiratory:  Negative for cough, shortness of breath and wheezing.    Cardiovascular:  Negative for chest pain and palpitations.   Gastrointestinal:  Negative for abdominal pain, nausea, vomiting and indigestion.   Endocrine: Negative for polyphagia and polyuria.   Psychiatric/Behavioral:  Positive for behavioral problems. Negative for agitation, sleep disturbance and negative for hyperactivity. The patient is nervous/anxious.        Objective   Physical Exam  Vitals reviewed.   Constitutional:       Appearance: She is well-developed.   HENT:      Nose: Rhinorrhea present.   Neck:      Thyroid: No thyromegaly.   Cardiovascular:      Heart sounds: Normal heart sounds.   Pulmonary:      Effort: Pulmonary effort is normal.      Breath sounds: Normal breath sounds. No wheezing.   Abdominal:      Tenderness: There is no abdominal tenderness.   Neurological:       Mental Status: She is alert. Mental status is at baseline.         Vitals:    04/07/25 1329   BP: 117/82   Pulse: 107   Resp: 16   Temp: 97.7 °F (36.5 °C)   SpO2: 98%     Current Outpatient Medications on File Prior to Visit   Medication Sig Dispense Refill    LORazepam (ATIVAN) 0.5 MG tablet TAKE 1 TABLET BY MOUTH 2 TIMES A DAY 60 tablet 0    Memantine HCl-Donepezil HCl (Namzaric) 14-10 MG capsule sustained-release 24 hr TAKE ONE CAPSULE BY MOUTH DAILY 90 capsule 3    omeprazole (priLOSEC) 40 MG capsule TAKE 1 CAPSULE BY MOUTH DAILY 30 capsule 3    QUEtiapine (SEROquel) 100 MG tablet TAKE ONE TABLET BY MOUTH THREE TIMES DAILY 180 tablet 1    [DISCONTINUED] loratadine (CLARITIN) 10 MG tablet TAKE 1 TABLET BY MOUTH DAILY 30 tablet 3    [DISCONTINUED] venlafaxine XR (EFFEXOR-XR) 75 MG 24 hr capsule TAKE ONE CAPSULE BY MOUTH DAILY WITH FOOD 60 capsule 3     No current facility-administered medications on file prior to visit.         Assessment & Plan   Problems Addressed this Visit       GERD (gastroesophageal reflux disease)    Relevant Orders    Comprehensive metabolic panel    CBC w AUTO Differential    Mixed anxiety depressive disorder - Primary    Relevant Medications    venlafaxine XR (EFFEXOR-XR) 75 MG 24 hr capsule    Other Relevant Orders    T4, free    TSH    Seasonal allergies    Relevant Medications    loratadine (CLARITIN) 10 MG tablet     Other Visit Diagnoses         Hyperglycemia        Relevant Orders    Hemoglobin A1c          Diagnoses         Codes Comments      Mixed anxiety depressive disorder    -  Primary ICD-10-CM: F41.8  ICD-9-CM: 300.4       Gastroesophageal reflux disease, unspecified whether esophagitis present     ICD-10-CM: K21.9  ICD-9-CM: 530.81       Seasonal allergies     ICD-10-CM: J30.2  ICD-9-CM: 477.9       Hyperglycemia     ICD-10-CM: R73.9  ICD-9-CM: 790.29             Assessment & Plan  1. Anxiety.  Her anxiety worsens when she goes out, but she is taking lorazepam and Effexor  daily. She experiences situational anxiety, particularly when there are changes in her routine or environment. She is encouraged to continue her current medication regimen.    2. Gastroesophageal Reflux Disease (GERD).  She is currently taking omeprazole for heartburn. She reports no significant stomach issues.    3.  Hyperglycemia.  Advise low-carb diet and exercise.  Will check fasting blood glucose and hemoglobin A1c.    4.  Seasonal allergies  Refill Claritin             Patient or patient representative verbalized consent for the use of Ambient Listening during the visit with  Marilyn Pacheco MD for chart documentation. 4/7/2025  13:41 EDT

## 2025-04-10 DIAGNOSIS — F41.8 MIXED ANXIETY DEPRESSIVE DISORDER: ICD-10-CM

## 2025-04-10 RX ORDER — LORAZEPAM 0.5 MG/1
0.5 TABLET ORAL 2 TIMES DAILY
Qty: 60 TABLET | Refills: 0 | Status: SHIPPED | OUTPATIENT
Start: 2025-04-10

## 2025-04-10 NOTE — TELEPHONE ENCOUNTER
Caller: JUANITO GUZMÁN    Relationship: Emergency Contact    Best call back number: [phone]    Requested Prescriptions:   Requested Prescriptions     Pending Prescriptions Disp Refills    LORazepam (ATIVAN) 0.5 MG tablet 60 tablet 0     Sig: Take 1 tablet by mouth 2 (Two) Times a Day.        Pharmacy where request should be sent: [facility] [id number] [facility], [place] - [address] - [phone]  - [phone] FX     Last office visit with prescribing clinician: 4/7/2025   Last telemedicine visit with prescribing clinician: Visit date not found   Next office visit with prescribing clinician: Visit date not found     Additional details provided by patient:     Does the patient have less than a 3 day supply:  [x] Yes  [] No    Would you like a call back once the refill request has been completed: [] Yes [] No    If the office needs to give you a call back, can they leave a voicemail: [] Yes [] No    Katerina Pedroza Rep   04/10/25 08:23 EDT

## 2025-04-17 ENCOUNTER — TELEPHONE (OUTPATIENT)
Dept: FAMILY MEDICINE CLINIC | Facility: CLINIC | Age: 60
End: 2025-04-17
Payer: MEDICARE

## 2025-04-17 RX ORDER — OMEPRAZOLE 40 MG/1
40 CAPSULE, DELAYED RELEASE ORAL DAILY
Qty: 30 CAPSULE | Refills: 3 | Status: SHIPPED | OUTPATIENT
Start: 2025-04-17

## 2025-04-17 NOTE — TELEPHONE ENCOUNTER
Prescription  for loratadine was sent to the [facility] pharmacy on 4/7/2025, thanks

## 2025-04-17 NOTE — TELEPHONE ENCOUNTER
Patients caregiver Glendy called in stating the last time she was seen here and she stated you said you would send in a prescription for an allergy medicine. They would like that sent to [facility] on file. Please advise.

## 2025-05-05 RX ORDER — VENLAFAXINE HYDROCHLORIDE 75 MG/1
75 CAPSULE, EXTENDED RELEASE ORAL DAILY
Qty: 60 CAPSULE | Refills: 3 | Status: SHIPPED | OUTPATIENT
Start: 2025-05-05

## 2025-05-21 DIAGNOSIS — F41.8 MIXED ANXIETY DEPRESSIVE DISORDER: ICD-10-CM

## 2025-05-21 NOTE — TELEPHONE ENCOUNTER
Caller: JUANITO GUZMÁN    Relationship: Emergency Contact    Best call back number: [phone]    Requested Prescriptions:   Requested Prescriptions     Pending Prescriptions Disp Refills    LORazepam (ATIVAN) 0.5 MG tablet 60 tablet 0     Sig: Take 1 tablet by mouth 2 (Two) Times a Day.      Pharmacy where request should be sent: [facility] [id number] [facility], [place] - [address] - [phone]  - [phone] FX     Last office visit with prescribing clinician: 4/7/2025   Last telemedicine visit with prescribing clinician: Visit date not found   Next office visit with prescribing clinician: Visit date not found       Does the patient have less than a 3 day supply:  [x] Yes  [] No    Katerina Mcdonnell Rep   05/21/25 14:07 EDT

## 2025-05-22 RX ORDER — LORAZEPAM 0.5 MG/1
0.5 TABLET ORAL 2 TIMES DAILY
Qty: 60 TABLET | Refills: 0 | Status: SHIPPED | OUTPATIENT
Start: 2025-05-22

## 2025-06-30 DIAGNOSIS — F41.8 MIXED ANXIETY DEPRESSIVE DISORDER: ICD-10-CM

## 2025-06-30 NOTE — TELEPHONE ENCOUNTER
Caller: JUANITO GUZMÁN    Relationship: Emergency Contact    Best call back number: [phone]     Requested Prescriptions:   Requested Prescriptions     Pending Prescriptions Disp Refills    LORazepam (ATIVAN) 0.5 MG tablet 60 tablet 0     Sig: Take 1 tablet by mouth 2 (Two) Times a Day.        Pharmacy where request should be sent: [facility] [id number] [facility], [place] - [address] - [phone] PH - [phone] FX     Last office visit with prescribing clinician: 4/7/2025   Last telemedicine visit with prescribing clinician: Visit date not found   Next office visit with prescribing clinician: Visit date not found     Additional details provided by patient:     Does the patient have less than a 3 day supply:  [x] Yes  [] No    Would you like a call back once the refill request has been completed: [] Yes [x] No    If the office needs to give you a call back, can they leave a voicemail: [] Yes [x] No    Katerina Fuentes Rep   06/30/25 13:17 EDT

## 2025-07-01 RX ORDER — LORAZEPAM 0.5 MG/1
0.5 TABLET ORAL 2 TIMES DAILY
Qty: 60 TABLET | Refills: 0 | Status: SHIPPED | OUTPATIENT
Start: 2025-07-01

## 2025-07-08 RX ORDER — MEMANTINE AND DONEPEZIL 14; 10 MG/1; MG/1
1 CAPSULE, EXTENDED RELEASE ORAL DAILY
Qty: 90 CAPSULE | Refills: 3 | Status: SHIPPED | OUTPATIENT
Start: 2025-07-08

## 2025-07-08 NOTE — TELEPHONE ENCOUNTER
Caller: JUANITO GUZMÁN    Relationship: Emergency Contact    Best call back number:   Telephone Information:   Mobile [phone]         Requested Prescriptions:   Requested Prescriptions     Pending Prescriptions Disp Refills    Memantine HCl-Donepezil HCl (Namzaric) 14-10 MG capsule sustained-release 24 hr 90 capsule 3     Sig: Take 1 capsule by mouth Daily.        Pharmacy where request should be sent: [id number] - [facility] [address] [facility] - [phone] [facility] [phone]      Last office visit with prescribing clinician: 4/7/2025   Last telemedicine visit with prescribing clinician: Visit date not found   Next office visit with prescribing clinician: Visit date not found     Additional details provided by patient: PATIENT CAREGIVER STATED THIS NEEDS TO BE RESENT TO THIS PHARMACY AS [facility] STATED THEY DID NOT IN STOCK. PATIENT IS CLOSE TO BEING OUT.     Does the patient have less than a 3 day supply:  [x] Yes  [] No    Would you like a call back once the refill request has been completed: [x] Yes  No[]  If the office needs to give you a call back, can they leave a voicemail: [x] Yes [] No    Katerina Upton Rep   07/08/25 16:00 EDT

## 2025-07-09 ENCOUNTER — TELEPHONE (OUTPATIENT)
Dept: FAMILY MEDICINE CLINIC | Facility: CLINIC | Age: 60
End: 2025-07-09

## 2025-08-12 DIAGNOSIS — F41.8 MIXED ANXIETY DEPRESSIVE DISORDER: ICD-10-CM

## 2025-08-13 DIAGNOSIS — J30.2 SEASONAL ALLERGIES: ICD-10-CM

## 2025-08-14 RX ORDER — LORAZEPAM 0.5 MG/1
0.5 TABLET ORAL 2 TIMES DAILY
Qty: 60 TABLET | Refills: 0 | Status: SHIPPED | OUTPATIENT
Start: 2025-08-14

## 2025-08-14 RX ORDER — LORATADINE 10 MG/1
10 TABLET ORAL DAILY
Qty: 30 TABLET | Refills: 3 | Status: SHIPPED | OUTPATIENT
Start: 2025-08-14

## 2025-08-14 RX ORDER — MEMANTINE AND DONEPEZIL 14; 10 MG/1; MG/1
1 CAPSULE, EXTENDED RELEASE ORAL DAILY
Qty: 90 CAPSULE | Refills: 3 | Status: SHIPPED | OUTPATIENT
Start: 2025-08-14

## 2025-08-18 RX ORDER — OMEPRAZOLE 40 MG/1
40 CAPSULE, DELAYED RELEASE ORAL DAILY
Qty: 30 CAPSULE | Refills: 3 | Status: SHIPPED | OUTPATIENT
Start: 2025-08-18

## (undated) DEVICE — TRAP,MUCUS SPECIMEN, 80CC: Brand: MEDLINE

## (undated) DEVICE — SNAR POLYP CAPTIVATOR HEX 27MM 240CM

## (undated) DEVICE — PAPR PRNT PK SONY W RIBN UPC55

## (undated) DEVICE — KT SYR GEL ORISE TWIN PK 10ML

## (undated) DEVICE — THE CARR-LOCKE INJECTION NEEDLE IS A SINGLE USE, DISPOSABLE, FLEXIBLE SHEATH INJECTION NEEDLE USED FOR THE INJECTION OF VARIOUS TYPES OF MEDIA THROUGH FLEXIBLE ENDOSCOPES.

## (undated) DEVICE — 3M™ PATIENT PLATE, CORDED, SPLIT, LARGE, 40 PER CASE, 1179: Brand: 3M™

## (undated) DEVICE — BITEBLOCK ENDO W/STRAP 60F A/ LF DISP

## (undated) DEVICE — SINGLE-USE BIOPSY FORCEPS: Brand: RADIAL JAW 4

## (undated) DEVICE — PK ENDO GI 50